# Patient Record
Sex: MALE | Race: WHITE | Employment: FULL TIME | URBAN - NONMETROPOLITAN AREA
[De-identification: names, ages, dates, MRNs, and addresses within clinical notes are randomized per-mention and may not be internally consistent; named-entity substitution may affect disease eponyms.]

---

## 2017-01-09 PROBLEM — I10 ESSENTIAL HYPERTENSION: Status: ACTIVE | Noted: 2017-01-09

## 2017-01-10 ENCOUNTER — ALLIED HEALTH/NURSE VISIT (OUTPATIENT)
Dept: FAMILY MEDICINE | Facility: CLINIC | Age: 47
End: 2017-01-10
Payer: COMMERCIAL

## 2017-01-10 VITALS — DIASTOLIC BLOOD PRESSURE: 86 MMHG | HEART RATE: 72 BPM | SYSTOLIC BLOOD PRESSURE: 130 MMHG

## 2017-01-10 DIAGNOSIS — I10 ESSENTIAL HYPERTENSION: Primary | ICD-10-CM

## 2017-01-10 DIAGNOSIS — I10 ESSENTIAL HYPERTENSION: ICD-10-CM

## 2017-01-10 LAB
ANION GAP SERPL CALCULATED.3IONS-SCNC: 8 MMOL/L (ref 3–14)
BUN SERPL-MCNC: 16 MG/DL (ref 7–30)
CALCIUM SERPL-MCNC: 9 MG/DL (ref 8.5–10.1)
CHLORIDE SERPL-SCNC: 101 MMOL/L (ref 94–109)
CO2 SERPL-SCNC: 26 MMOL/L (ref 20–32)
CREAT SERPL-MCNC: 0.99 MG/DL (ref 0.66–1.25)
GFR SERPL CREATININE-BSD FRML MDRD: 81 ML/MIN/1.7M2
GLUCOSE SERPL-MCNC: 107 MG/DL (ref 70–99)
POTASSIUM SERPL-SCNC: 3.8 MMOL/L (ref 3.4–5.3)
SODIUM SERPL-SCNC: 135 MMOL/L (ref 133–144)

## 2017-01-10 PROCEDURE — 36415 COLL VENOUS BLD VENIPUNCTURE: CPT | Performed by: NURSE PRACTITIONER

## 2017-01-10 PROCEDURE — 80048 BASIC METABOLIC PNL TOTAL CA: CPT | Performed by: NURSE PRACTITIONER

## 2017-01-10 PROCEDURE — 99207 ZZC NO CHARGE NURSE ONLY: CPT

## 2017-01-10 NOTE — NURSING NOTE
S-(situation): RN visit for BP check in F/U to 12-27-16 OV-       2. Essential hypertension  Blood pressure continues to be high    Goal is less than 140/90    Will start low dose lisinopril take in AM    See me back in 2 weeks for recheck        B-(background): Taking lisinopril as prescribed, questions possible s/e cough but at this time hard to tell as had previous viral illness- will monitor. No home BP monitoring.    A-(assessment):   BP Readings from Last 6 Encounters:   01/10/17 130/86   12/27/16 135/90   12/01/16 148/98   11/16/16 118/76   11/08/16 128/88   11/04/16 149/96     HR 72  Recheck BP same.  Does a lot of the grocery shopping and cooking, denies excess sodium intake. Denies excess caffeine intake. Active lifestyle.   Discussed how lowering sodium can help lower BP, recommended more label reading limiting sodium intake to 2000mg daily.  Per TAMAR Lim, will do BMP today- drawn.    R-(recommendations): Advised to continue lisinopril 5 mg daily, monitor/ limit sodium intake. Will call on lab results.  RN visit again in 2 wks for BP check. Forwarded to Carina for review, any further recommendations.  KAL Koenig RN

## 2017-01-13 ENCOUNTER — TELEPHONE (OUTPATIENT)
Dept: FAMILY MEDICINE | Facility: CLINIC | Age: 47
End: 2017-01-13

## 2017-01-13 NOTE — TELEPHONE ENCOUNTER
Per 01-10-17 RN BP visit-    B-(background): Taking lisinopril as prescribed, questions possible s/e cough but at this time hard to tell as had previous viral illness- will monitor. No home BP monitoring.        Spoke with pt who states now feels cough r/t side effect lisinopril. Reports cough occuring regularly after taking lisinopril.  BP Readings from Last 6 Encounters:   01/10/17 130/86   12/27/16 135/90   12/01/16 148/98   11/16/16 118/76   11/08/16 128/88   11/04/16 149/96         Component      Latest Ref Rng 1/10/2017   Sodium      133 - 144 mmol/L 135   Potassium      3.4 - 5.3 mmol/L 3.8   Chloride      94 - 109 mmol/L 101   Carbon Dioxide      20 - 32 mmol/L 26   Anion Gap      3 - 14 mmol/L 8   Glucose      70 - 99 mg/dL 107 (H)   Urea Nitrogen      7 - 30 mg/dL 16   Creatinine      0.66 - 1.25 mg/dL 0.99   GFR Estimate      >60 mL/min/1.7m2 81   GFR Estimate If Black      >60 mL/min/1.7m2 >90 . . .   Calcium      8.5 - 10.1 mg/dL 9.0     Please advise alternate Rx, F/U.  L. BERTA Koenig

## 2017-01-13 NOTE — TELEPHONE ENCOUNTER
Recommend monitor this for the next 2 weeks.   If this does not improve or seems worse. Please call and let me know.  Carina BOYLE

## 2017-01-13 NOTE — TELEPHONE ENCOUNTER
Patient was in the other day and had a BP check. He was asked if he ever had any coughing with the medication. He states he wasn't really paying attention at the time but once he thinks about it, he does have a cough when he takes the medication. Wondering if he could be switched to something else. Please advise.    Hilda [facility]

## 2017-04-24 ENCOUNTER — OFFICE VISIT (OUTPATIENT)
Dept: FAMILY MEDICINE | Facility: CLINIC | Age: 47
End: 2017-04-24
Payer: COMMERCIAL

## 2017-04-24 VITALS
SYSTOLIC BLOOD PRESSURE: 132 MMHG | WEIGHT: 262 LBS | RESPIRATION RATE: 18 BRPM | HEART RATE: 90 BPM | BODY MASS INDEX: 36.54 KG/M2 | OXYGEN SATURATION: 98 % | DIASTOLIC BLOOD PRESSURE: 86 MMHG | TEMPERATURE: 98.4 F

## 2017-04-24 DIAGNOSIS — J20.9 ACUTE BRONCHITIS, UNSPECIFIED ORGANISM: Primary | ICD-10-CM

## 2017-04-24 PROCEDURE — 99213 OFFICE O/P EST LOW 20 MIN: CPT | Performed by: NURSE PRACTITIONER

## 2017-04-24 RX ORDER — PREDNISONE 20 MG/1
40 TABLET ORAL DAILY
Qty: 10 TABLET | Refills: 0 | Status: SHIPPED | OUTPATIENT
Start: 2017-04-24 | End: 2017-04-29

## 2017-04-24 NOTE — PATIENT INSTRUCTIONS
Let try 5 days of prednisone   40 mg take in AM     Could try OVER THE COUNTER antihistamine claritin, zyrtec or allegra and see if this helps    If persists see me back

## 2017-04-24 NOTE — NURSING NOTE
"Chief Complaint   Patient presents with     Cough       Initial /86 (BP Location: Right arm, Patient Position: Chair, Cuff Size: Adult Large)  Pulse 90  Temp 98.4  F (36.9  C) (Tympanic)  Resp 18  Wt 262 lb (118.8 kg)  SpO2 98%  BMI 36.54 kg/m2 Estimated body mass index is 36.54 kg/(m^2) as calculated from the following:    Height as of 12/27/16: 5' 11\" (1.803 m).    Weight as of this encounter: 262 lb (118.8 kg).  Medication Reconciliation: complete    Health Maintenance that is potentially due pending provider review:  NONE    n/a      "

## 2017-04-24 NOTE — MR AVS SNAPSHOT
"              After Visit Summary   4/24/2017    Scott Fernandez    MRN: [id number]           Patient Information     Date Of Birth          1970        Visit Information        Provider Department      4/24/2017 1:40 PM Olga Lidia Roldan NP [facility]        Today's Diagnoses     Acute bronchitis, unspecified organism    -  1      Care Instructions    Let try 5 days of prednisone   40 mg take in AM     Could try OVER THE COUNTER antihistamine claritin, zyrtec or allegra and see if this helps    If persists see me back         Follow-ups after your visit        Who to contact     If you have questions or need follow up information about today's clinic visit or your schedule please contact [facility] directly at [phone].  Normal or non-critical lab and imaging results will be communicated to you by [employer], letter or phone within 4 business days after the clinic has received the results. If you do not hear from us within 7 days, please contact the clinic through [employer] or phone. If you have a critical or abnormal lab result, we will notify you by phone as soon as possible.  Submit refill requests through [employer] or call your pharmacy and they will forward the refill request to us. Please allow 3 business days for your refill to be completed.          Additional Information About Your Visit        MyChart Information     [employer] lets you send messages to your doctor, view your test results, renew your prescriptions, schedule appointments and more. To sign up, go to [email or web address] . Click on \"Log in\" on the left side of the screen, which will take you to the Welcome page. Then click on \"Sign up Now\" on the right side of the page.     You will be asked to enter the access code listed below, as well as some personal information. Please follow the directions to create your username and password.     Your access code is: [email or web address]  Expires: 7/23/2017  2:19 PM   "   Your access code will  in 90 days. If you need help or a new code, please call your [facility]) or [phone].        Care EveryWhere ID     This is your Care EveryWhere ID. This could be used by other organizations to access your [facility] medical records  [id number]        Your Vitals Were     Pulse Temperature Respirations Pulse Oximetry BMI (Body Mass Index)       90 98.4  F (36.9  C) (Tympanic) 18 98% 36.54 kg/m2        Blood Pressure from Last 3 Encounters:   17 132/86   01/10/17 130/86   16 135/90    Weight from Last 3 Encounters:   17 262 lb (118.8 kg)   16 265 lb (120.2 kg)   16 244 lb (110.7 kg)              Today, you had the following     No orders found for display         Today's Medication Changes          These changes are accurate as of: 17  2:19 PM.  If you have any questions, ask your nurse or doctor.               Start taking these medicines.        Dose/Directions    predniSONE 20 MG tablet   Commonly known as:  DELTASONE   Used for:  Acute bronchitis, unspecified organism   Started by:  Olga Lidia Roldan NP        Dose:  40 mg   Take 2 tablets (40 mg) by mouth daily for 5 days   Quantity:  10 tablet   Refills:  0         Stop taking these medicines if you haven't already. Please contact your care team if you have questions.     lisinopril 5 MG tablet   Commonly known as:  PRINIVIL/ZESTRIL   Stopped by:  Olga Lidia Roldan NP                Where to get your medicines      These medications were sent to [facility] - [facility] [address]   [address] [facility] [address]     Phone:  [phone]     predniSONE 20 MG tablet                Primary Care Provider Office Phone # Fax #    Olga Lidia Roldan [phone]       [facility] [address]  [facility] [address]        Thank you!     Thank you for choosing [facility]  for your care. Our goal is always to provide you  with excellent care. Hearing back from our patients is one way we can continue to improve our services. Please take a few minutes to complete the written survey that you may receive in the mail after your visit with us. Thank you!             Your Updated Medication List - Protect others around you: Learn how to safely use, store and throw away your medicines at www.disposemymeds.org.          This list is accurate as of: 4/24/17  2:19 PM.  Always use your most recent med list.                   Brand Name Dispense Instructions for use    fluticasone 50 MCG/ACT spray    FLONASE    1 Bottle    Spray 1-2 sprays into both nostrils daily       predniSONE 20 MG tablet    DELTASONE    10 tablet    Take 2 tablets (40 mg) by mouth daily for 5 days       simvastatin 20 MG tablet    ZOCOR    90 tablet    Take 1 tablet (20 mg) by mouth At Bedtime

## 2017-04-24 NOTE — PROGRESS NOTES
SUBJECTIVE:                                                    Scott Fernandez is a 46 year old male who presents to clinic today for the following health issues:      ENT Symptoms             Symptoms: cc Present Absent Comment   Fever/Chills   x    Fatigue   x    Muscle Aches   x    Eye Irritation   x    Sneezing   x    Nasal Rafael/Drg   x    Sinus Pressure/Pain   x    Loss of smell   x    Dental pain   x    Sore Throat   x    Swollen Glands   x    Ear Pain/Fullness   x    Cough  x     Wheeze   x    Chest Pain   x    Shortness of breath   x    Rash   x    Other         Symptom duration:  1 week   Symptom severity:  Cough   Treatments tried:  OTC cold and cough medications   Contacts:  none     Wt Readings from Last 10 Encounters:   04/24/17 262 lb (118.8 kg)   12/27/16 265 lb (120.2 kg)   11/16/16 244 lb (110.7 kg)   11/08/16 263 lb 6.4 oz (119.5 kg)   11/04/16 262 lb (118.8 kg)   10/28/16 262 lb (118.8 kg)   07/26/16 261 lb (118.4 kg)   03/25/16 271 lb (122.9 kg)   08/29/14 247 lb 3.2 oz (112.1 kg)   07/24/14 244 lb 9.6 oz (110.9 kg)       Stopped lisinopril   Controlled today   Made diet changes       Problem list and histories reviewed & adjusted, as indicated.  Additional history: as documented    Labs reviewed in EPIC    Reviewed and updated as needed this visit by clinical staff  Tobacco  Allergies  Med Hx  Surg Hx  Fam Hx  Soc Hx      Reviewed and updated as needed this visit by Provider         ROS:  Constitutional, HEENT, cardiovascular, pulmonary, gi and gu systems are negative, except as otherwise noted.    OBJECTIVE:                                                    /86 (BP Location: Right arm, Patient Position: Chair, Cuff Size: Adult Large)  Pulse 90  Temp 98.4  F (36.9  C) (Tympanic)  Resp 18  Wt 262 lb (118.8 kg)  SpO2 98%  BMI 36.54 kg/m2  Body mass index is 36.54 kg/(m^2).  GENERAL APPEARANCE: healthy, alert and no distress  HENT: ear canals and TM's normal and nose and  mouth without ulcers or lesions  RESP: lungs clear to auscultation - no rales, rhonchi or wheezes  CV: regular rates and rhythm, normal S1 S2, no S3 or S4 and no murmur, click or rub  ABDOMEN: soft, nontender, without hepatosplenomegaly or masses and bowel sounds normal    Diagnostic test results:  Diagnostic Test Results:  none      ASSESSMENT/PLAN:                                                    1. Acute bronchitis, unspecified organism  Will try short course of prednisone to help with symptoms management   If not better after finishing the prednisone see me back for further work up   - predniSONE (DELTASONE) 20 MG tablet; Take 2 tablets (40 mg) by mouth daily for 5 days  Dispense: 10 tablet; Refill: 0    Patient Instructions   Let try 5 days of prednisone   40 mg take in AM     Could try OVER THE COUNTER antihistamine claritin, zyrtec or allegra and see if this helps    If persists see me back       Olga Lidia Roldan NP  [facility]

## 2017-04-28 ENCOUNTER — TELEPHONE (OUTPATIENT)
Dept: FAMILY MEDICINE | Facility: CLINIC | Age: 47
End: 2017-04-28

## 2017-04-28 NOTE — TELEPHONE ENCOUNTER
Per 04-24-17 OV-    1. Acute bronchitis, unspecified organism  Will try short course of prednisone to help with symptoms management   If not better after finishing the prednisone see me back for further work up   - predniSONE (DELTASONE) 20 MG tablet; Take 2 tablets (40 mg) by mouth daily for 5 days Dispense: 10 tablet; Refill: 0     Patient Instructions   Let try 5 days of prednisone   40 mg take in AM      Could try OVER THE COUNTER antihistamine claritin, zyrtec or allegra and see if this helps     If persists see me back        Reports persistent non- productive cough despite prednisone, OTC cough/ cold/ allergy med and nasal spray.  Denies chest congestion, wheezing, SOA. No fevers. Denies sinus pressure/ pain.  States feels well except for cough, stuffy nose.  Advised need for F/U per OV instructions.  States unable to come in today (working), denies need for UC.  F/U appt with Carina scheduled 05-01-17.   UC over wknd if sx worsen.  KAL Koenig RN

## 2017-05-01 ENCOUNTER — OFFICE VISIT (OUTPATIENT)
Dept: FAMILY MEDICINE | Facility: CLINIC | Age: 47
End: 2017-05-01
Payer: COMMERCIAL

## 2017-05-01 VITALS
OXYGEN SATURATION: 99 % | RESPIRATION RATE: 18 BRPM | HEART RATE: 111 BPM | BODY MASS INDEX: 36.57 KG/M2 | DIASTOLIC BLOOD PRESSURE: 84 MMHG | SYSTOLIC BLOOD PRESSURE: 130 MMHG | WEIGHT: 262.2 LBS | TEMPERATURE: 98.6 F

## 2017-05-01 DIAGNOSIS — J01.90 ACUTE SINUSITIS WITH SYMPTOMS > 10 DAYS: Primary | ICD-10-CM

## 2017-05-01 DIAGNOSIS — E66.01 MORBID OBESITY, UNSPECIFIED OBESITY TYPE (H): ICD-10-CM

## 2017-05-01 DIAGNOSIS — J20.9 ACUTE BRONCHITIS WITH SYMPTOMS > 10 DAYS: ICD-10-CM

## 2017-05-01 PROCEDURE — 99213 OFFICE O/P EST LOW 20 MIN: CPT | Performed by: NURSE PRACTITIONER

## 2017-05-01 NOTE — PATIENT INSTRUCTIONS
1. Acute sinusitis with symptoms > 10 days    2. Acute bronchitis with symptoms > 10 days  - amoxicillin-clavulanate (AUGMENTIN) 875-125 MG per tablet; Take 1 tablet by mouth 2 times daily  Dispense: 20 tablet; Refill: 0    Sinusitis (Antibiotic Treatment)    The sinuses are air-filled spaces within the bones of the face. They connect to the inside of the nose. Sinusitis is an inflammation of the tissue lining the sinus cavity. Sinus inflammation can occur during a cold. It can also be due to allergies to pollens and other particles in the air. Sinusitis can cause symptoms of sinus congestion and fullness. A sinus infection causes fever, headache and facial pain. There is often green or yellow drainage from the nose or into the back of the throat (post-nasal drip). You have been given antibiotics to treat this condition.  Home care:    Take the full course of antibiotics as instructed. Do not stop taking them, even if you feel better.    Drink plenty of water, hot tea, and other liquids. This may help thin mucus. It also may promote sinus drainage.    Heat may help soothe painful areas of the face. Use a towel soaked in hot water. Or,  the shower and direct the hot spray onto your face. Using a vaporizer along with a menthol rub at night may also help.     An expectorant containing guaifenesin may help thin the mucus and promote drainage from the sinuses.    Over-the-counter decongestants may be used unless a similar medicine was prescribed. Nasal sprays work the fastest. Use one that contains phenylephrine or oxymetazoline. First blow the nose gently. Then use the spray. Do not use these medicines more often than directed on the label or symptoms may get worse. You may also use tablets containing pseudoephedrine. Avoid products that combine ingredients, because side effects may be increased. Read labels. You can also ask the pharmacist for help. (NOTE: Persons with high blood pressure should not use  decongestants. They can raise blood pressure.)    Over-the-counter antihistamines may help if allergies contributed to your sinusitis.      Do not use nasal rinses or irrigation during an acute sinus infection, unless told to by your health care provider. Rinsing may spread the infection to other sinuses.    Use acetaminophen or ibuprofen to control pain, unless another pain medicine was prescribed. (If you have chronic liver or kidney disease or ever had a stomach ulcer, talk with your doctor before using these medicines. Aspirin should never be used in anyone under 18 years of age who is ill with a fever. It may cause severe liver damage.)    Don't smoke. This can worsen symptoms.  Follow-up care  Follow up with your healthcare provider or our staff if you are not improving within the next week.  When to seek medical advice  Call your healthcare provider if any of these occur:    Facial pain or headache becoming more severe    Stiff neck    Unusual drowsiness or confusion    Swelling of the forehead or eyelids    Vision problems, including blurred or double vision    Fever of 100.4 F (38 C) or higher, or as directed by your healthcare provider    Seizure    Breathing problems    Symptoms not resolving within 10 days    9578-8078 [employer]. [address] [place] [address], [place] [address]. All rights reserved. This information is not intended as a substitute for professional medical care. Always follow your healthcare professional's instructions.

## 2017-05-01 NOTE — NURSING NOTE
"Chief Complaint   Patient presents with     URI     Recheck       Initial /84 (BP Location: Right arm, Patient Position: Chair, Cuff Size: Adult Large)  Pulse 111  Temp 98.6  F (37  C) (Tympanic)  Resp 18  Wt 262 lb 3.2 oz (118.9 kg)  SpO2 99%  BMI 36.57 kg/m2 Estimated body mass index is 36.57 kg/(m^2) as calculated from the following:    Height as of 12/27/16: 5' 11\" (1.803 m).    Weight as of this encounter: 262 lb 3.2 oz (118.9 kg).  Medication Reconciliation: complete    Health Maintenance that is potentially due pending provider review:  NONE    n/a      "

## 2017-05-01 NOTE — MR AVS SNAPSHOT
After Visit Summary   5/1/2017    Scott Fernandez    MRN: [id number]           Patient Information     Date Of Birth          1970        Visit Information        Provider Department      5/1/2017 9:40 AM Olga Lidia Roldan NP [facility]        Today's Diagnoses     Acute sinusitis with symptoms > 10 days    -  1    Acute bronchitis with symptoms > 10 days          Care Instructions      1. Acute sinusitis with symptoms > 10 days    2. Acute bronchitis with symptoms > 10 days  - amoxicillin-clavulanate (AUGMENTIN) 875-125 MG per tablet; Take 1 tablet by mouth 2 times daily  Dispense: 20 tablet; Refill: 0    Sinusitis (Antibiotic Treatment)    The sinuses are air-filled spaces within the bones of the face. They connect to the inside of the nose. Sinusitis is an inflammation of the tissue lining the sinus cavity. Sinus inflammation can occur during a cold. It can also be due to allergies to pollens and other particles in the air. Sinusitis can cause symptoms of sinus congestion and fullness. A sinus infection causes fever, headache and facial pain. There is often green or yellow drainage from the nose or into the back of the throat (post-nasal drip). You have been given antibiotics to treat this condition.  Home care:    Take the full course of antibiotics as instructed. Do not stop taking them, even if you feel better.    Drink plenty of water, hot tea, and other liquids. This may help thin mucus. It also may promote sinus drainage.    Heat may help soothe painful areas of the face. Use a towel soaked in hot water. Or,  the shower and direct the hot spray onto your face. Using a vaporizer along with a menthol rub at night may also help.     An expectorant containing guaifenesin may help thin the mucus and promote drainage from the sinuses.    Over-the-counter decongestants may be used unless a similar medicine was prescribed. Nasal sprays work the fastest. Use one that  contains phenylephrine or oxymetazoline. First blow the nose gently. Then use the spray. Do not use these medicines more often than directed on the label or symptoms may get worse. You may also use tablets containing pseudoephedrine. Avoid products that combine ingredients, because side effects may be increased. Read labels. You can also ask the pharmacist for help. (NOTE: Persons with high blood pressure should not use decongestants. They can raise blood pressure.)    Over-the-counter antihistamines may help if allergies contributed to your sinusitis.      Do not use nasal rinses or irrigation during an acute sinus infection, unless told to by your health care provider. Rinsing may spread the infection to other sinuses.    Use acetaminophen or ibuprofen to control pain, unless another pain medicine was prescribed. (If you have chronic liver or kidney disease or ever had a stomach ulcer, talk with your doctor before using these medicines. Aspirin should never be used in anyone under 18 years of age who is ill with a fever. It may cause severe liver damage.)    Don't smoke. This can worsen symptoms.  Follow-up care  Follow up with your healthcare provider or our staff if you are not improving within the next week.  When to seek medical advice  Call your healthcare provider if any of these occur:    Facial pain or headache becoming more severe    Stiff neck    Unusual drowsiness or confusion    Swelling of the forehead or eyelids    Vision problems, including blurred or double vision    Fever of 100.4 F (38 C) or higher, or as directed by your healthcare provider    Seizure    Breathing problems    Symptoms not resolving within 10 days    7970-5205 [employer]. [address] [place] [address], [place] [address]. All rights reserved. This information is not intended as a substitute for professional medical care. Always follow your healthcare professional's instructions.              Follow-ups after your visit    "     Who to contact     If you have questions or need follow up information about today's clinic visit or your schedule please contact [facility] directly at [phone].  Normal or non-critical lab and imaging results will be communicated to you by MyChart, letter or phone within 4 business days after the clinic has received the results. If you do not hear from us within 7 days, please contact the clinic through [employer] or phone. If you have a critical or abnormal lab result, we will notify you by phone as soon as possible.  Submit refill requests through [employer] or call your pharmacy and they will forward the refill request to us. Please allow 3 business days for your refill to be completed.          Additional Information About Your Visit        [employer] Information     [employer] lets you send messages to your doctor, view your test results, renew your prescriptions, schedule appointments and more. To sign up, go to [email or web address] . Click on \"Log in\" on the left side of the screen, which will take you to the Welcome page. Then click on \"Sign up Now\" on the right side of the page.     You will be asked to enter the access code listed below, as well as some personal information. Please follow the directions to create your username and password.     Your access code is: [email or web address]  Expires: 2017  2:19 PM     Your access code will  in 90 days. If you need help or a new code, please call your [place] [facility] clinic or [phone].        Care EveryWhere ID     This is your Care EveryWhere ID. This could be used by other organizations to access your [place] [facility] medical records  [id number]        Your Vitals Were     Pulse Temperature Respirations Pulse Oximetry BMI (Body Mass Index)       111 98.6  F (37  C) (Tympanic) 18 99% 36.57 kg/m2        Blood Pressure from Last 3 Encounters:   17 130/84   17 132/86   01/10/17 130/86    Weight from Last 3 Encounters:   17 262 lb 3.2 " oz (118.9 kg)   04/24/17 262 lb (118.8 kg)   12/27/16 265 lb (120.2 kg)              Today, you had the following     No orders found for display         Today's Medication Changes          These changes are accurate as of: 5/1/17 10:02 AM.  If you have any questions, ask your nurse or doctor.               Start taking these medicines.        Dose/Directions    amoxicillin-clavulanate 875-125 MG per tablet   Commonly known as:  AUGMENTIN   Used for:  Acute sinusitis with symptoms > 10 days, Acute bronchitis with symptoms > 10 days   Started by:  Olga Lidia Roldan NP        Dose:  1 tablet   Take 1 tablet by mouth 2 times daily   Quantity:  20 tablet   Refills:  0            Where to get your medicines      These medications were sent to [facility] [address] [place] [address] [address]  [address] [facility] [address]     Phone:  [phone]     amoxicillin-clavulanate 875-125 MG per tablet                Primary Care Provider Office Phone # Fax #    Olga Lidia Roldan [phone]       [facility] [address] [facility] [address]        Thank you!     Thank you for choosing [facility]  for your care. Our goal is always to provide you with excellent care. Hearing back from our patients is one way we can continue to improve our services. Please take a few minutes to complete the written survey that you may receive in the mail after your visit with us. Thank you!             Your Updated Medication List - Protect others around you: Learn how to safely use, store and throw away your medicines at www.disposemymeds.org.          This list is accurate as of: 5/1/17 10:02 AM.  Always use your most recent med list.                   Brand Name Dispense Instructions for use    amoxicillin-clavulanate 875-125 MG per tablet    AUGMENTIN    20 tablet    Take 1 tablet by mouth 2 times daily       fluticasone 50 MCG/ACT spray    FLONASE    1 Bottle    Spray 1-2  sprays into both nostrils daily       simvastatin 20 MG tablet    ZOCOR    90 tablet    Take 1 tablet (20 mg) by mouth At Bedtime

## 2017-05-01 NOTE — PROGRESS NOTES
SUBJECTIVE:                                                    Scott Fernandez is a 46 year old male who presents to clinic today for the following health issues:      Patient finished 5 days of prednisone  Is still coughing, now C/O of being plugged up, facial pressure  Some ear pressure.  Has been dealing with this for almost 2 weeks   Felt little relief from the prednisone     Problem list and histories reviewed & adjusted, as indicated.  Additional history: as documented    Patient Active Problem List   Diagnosis     Obesity     HYPERLIPIDEMIA LDL GOAL <130     Sleeping difficulty     CTS (carpal tunnel syndrome)     GERD (gastroesophageal reflux disease)     Proteinuria     Essential hypertension     Past Surgical History:   Procedure Laterality Date     APPENDECTOMY  1990 approx     ARTHROSCOPY SHOULDER, OPEN ROTATOR CUFF REPAIR, COMBINED  7/22/2013    Procedure: COMBINED ARTHROSCOPY SHOULDER, OPEN ROTATOR CUFF REPAIR;  Right Shoulder Arthroscopic Mini-Open Rotator Cuff Repair;  Surgeon: Ley, Jeffrey Duane, MD;  Location: [facility]     LAPAROSCOPIC CHOLECYSTECTOMY N/A 11/16/2016    Procedure: LAPAROSCOPIC CHOLECYSTECTOMY;  Surgeon: Reg Giang MD;  Location: [facility]     ORTHOPEDIC SURGERY  2009    Left Shoulder     SURGICAL HISTORY OF -   1996    shoulder left RCR     SURGICAL HISTORY OF -   1980's    Carpal tunnel procedure 2 on rt wrist and 1 on left wrist     SURGICAL HISTORY OF -   2008    shoulder left       Social History   Substance Use Topics     Smoking status: Never Smoker     Smokeless tobacco: Never Used     Alcohol use No     Family History   Problem Relation Age of Onset     Cardiovascular Mother      CHF starting around 50-idiopathic cardiomyopathy     Lipids Father      Cardiovascular Father      Stents, in late 50's     C.A.D. Father      Alcohol/Drug Maternal Grandmother      Alcohol/Drug Maternal Grandfather            Reviewed and updated as needed this visit by clinical  staff       Reviewed and updated as needed this visit by Provider         ROS:  Constitutional, HEENT, cardiovascular, pulmonary, gi and gu systems are negative, except as otherwise noted.    OBJECTIVE:                                                    /84 (BP Location: Right arm, Patient Position: Chair, Cuff Size: Adult Large)  Pulse 111  Temp 98.6  F (37  C) (Tympanic)  Resp 18  Wt 262 lb 3.2 oz (118.9 kg)  SpO2 99%  BMI 36.57 kg/m2  Body mass index is 36.57 kg/(m^2).  GENERAL APPEARANCE: healthy, alert and no distress  HENT: ear canals and TM's normal and nose and mouth without ulcers or lesions  RESP: lungs clear to auscultation - no rales, rhonchi or wheezes  CV: regular rates and rhythm, normal S1 S2, no S3 or S4 and no murmur, click or rub  ABDOMEN: soft, nontender, without hepatosplenomegaly or masses and bowel sounds normal  MS: extremities normal- no gross deformities noted  SKIN: no suspicious lesions or rashes    Diagnostic test results:  Diagnostic Test Results:  none      ASSESSMENT/PLAN:                                                    1. Acute sinusitis with symptoms > 10 days  2. Acute bronchitis with symptoms > 10 days  Given length of illness with treat with antibiotic   He is to continue the anti histamine     - amoxicillin-clavulanate (AUGMENTIN) 875-125 MG per tablet; Take 1 tablet by mouth 2 times daily  Dispense: 20 tablet; Refill: 0      3. Morbid obesity, unspecified obesity type (H)  He is working on diet (low carb) and increasing exercise     Patient Instructions     1. Acute sinusitis with symptoms > 10 days    2. Acute bronchitis with symptoms > 10 days  - amoxicillin-clavulanate (AUGMENTIN) 875-125 MG per tablet; Take 1 tablet by mouth 2 times daily  Dispense: 20 tablet; Refill: 0    Sinusitis (Antibiotic Treatment)    The sinuses are air-filled spaces within the bones of the face. They connect to the inside of the nose. Sinusitis is an inflammation of the tissue lining  the sinus cavity. Sinus inflammation can occur during a cold. It can also be due to allergies to pollens and other particles in the air. Sinusitis can cause symptoms of sinus congestion and fullness. A sinus infection causes fever, headache and facial pain. There is often green or yellow drainage from the nose or into the back of the throat (post-nasal drip). You have been given antibiotics to treat this condition.  Home care:    Take the full course of antibiotics as instructed. Do not stop taking them, even if you feel better.    Drink plenty of water, hot tea, and other liquids. This may help thin mucus. It also may promote sinus drainage.    Heat may help soothe painful areas of the face. Use a towel soaked in hot water. Or,  the shower and direct the hot spray onto your face. Using a vaporizer along with a menthol rub at night may also help.     An expectorant containing guaifenesin may help thin the mucus and promote drainage from the sinuses.    Over-the-counter decongestants may be used unless a similar medicine was prescribed. Nasal sprays work the fastest. Use one that contains phenylephrine or oxymetazoline. First blow the nose gently. Then use the spray. Do not use these medicines more often than directed on the label or symptoms may get worse. You may also use tablets containing pseudoephedrine. Avoid products that combine ingredients, because side effects may be increased. Read labels. You can also ask the pharmacist for help. (NOTE: Persons with high blood pressure should not use decongestants. They can raise blood pressure.)    Over-the-counter antihistamines may help if allergies contributed to your sinusitis.      Do not use nasal rinses or irrigation during an acute sinus infection, unless told to by your health care provider. Rinsing may spread the infection to other sinuses.    Use acetaminophen or ibuprofen to control pain, unless another pain medicine was prescribed. (If you have  chronic liver or kidney disease or ever had a stomach ulcer, talk with your doctor before using these medicines. Aspirin should never be used in anyone under 18 years of age who is ill with a fever. It may cause severe liver damage.)    Don't smoke. This can worsen symptoms.  Follow-up care  Follow up with your healthcare provider or our staff if you are not improving within the next week.  When to seek medical advice  Call your healthcare provider if any of these occur:    Facial pain or headache becoming more severe    Stiff neck    Unusual drowsiness or confusion    Swelling of the forehead or eyelids    Vision problems, including blurred or double vision    Fever of 100.4 F (38 C) or higher, or as directed by your healthcare provider    Seizure    Breathing problems    Symptoms not resolving within 10 days    8185-7623 [employer]. [address] [place] [address], [place] [address]. All rights reserved. This information is not intended as a substitute for professional medical care. Always follow your healthcare professional's instructions.            Olga Lidia Roldan NP  [facility]

## 2017-05-30 ENCOUNTER — OFFICE VISIT (OUTPATIENT)
Dept: FAMILY MEDICINE | Facility: CLINIC | Age: 47
End: 2017-05-30
Payer: COMMERCIAL

## 2017-05-30 VITALS
HEART RATE: 79 BPM | RESPIRATION RATE: 18 BRPM | OXYGEN SATURATION: 98 % | SYSTOLIC BLOOD PRESSURE: 135 MMHG | DIASTOLIC BLOOD PRESSURE: 85 MMHG | TEMPERATURE: 97.9 F

## 2017-05-30 DIAGNOSIS — J01.90 ACUTE SINUSITIS WITH SYMPTOMS > 10 DAYS: Primary | ICD-10-CM

## 2017-05-30 DIAGNOSIS — J30.89 SEASONAL ALLERGIC RHINITIS DUE TO OTHER ALLERGIC TRIGGER: ICD-10-CM

## 2017-05-30 PROCEDURE — 99213 OFFICE O/P EST LOW 20 MIN: CPT | Performed by: NURSE PRACTITIONER

## 2017-05-30 RX ORDER — LEVOFLOXACIN 500 MG/1
500 TABLET, FILM COATED ORAL DAILY
Qty: 10 TABLET | Refills: 0 | Status: SHIPPED | OUTPATIENT
Start: 2017-05-30 | End: 2018-05-23

## 2017-05-30 RX ORDER — LORATADINE 10 MG/1
10 TABLET ORAL DAILY
Qty: 30 TABLET | Refills: 1 | COMMUNITY
Start: 2017-05-30 | End: 2018-10-12

## 2017-05-30 NOTE — NURSING NOTE
"Chief Complaint   Patient presents with     URI     x 6 weeks       Initial /84 (BP Location: Right arm, Patient Position: Chair, Cuff Size: Adult Regular)  Pulse 79  Temp 97.9  F (36.6  C) (Tympanic)  Resp 18  SpO2 98% Estimated body mass index is 36.57 kg/(m^2) as calculated from the following:    Height as of 12/27/16: 5' 11\" (1.803 m).    Weight as of 5/1/17: 262 lb 3.2 oz (118.9 kg).  Medication Reconciliation: complete    Health Maintenance that is potentially due pending provider review:  PHQ9 and GAD7    Possibly completing today per provider review.    "

## 2017-05-30 NOTE — PROGRESS NOTES
SUBJECTIVE:                                                    Scott Fernandez is a 47 year old male who presents to clinic today for the following health issues:      RESPIRATORY SYMPTOMS      Duration: 6 weeks off and on    Description: sinus pressure   nasal congestion, rhinorrhea and cough    Severity: moderate    Accompanying signs and symptoms: None    History (predisposing factors):  Previously treated    Precipitating or alleviating factors: None    Therapies tried and outcome:  prednisone and antibiotic     He felt great after being on the antibiotic for 2-3 days   Once off the antibiotic symptoms returned 5/24      Problem list and histories reviewed & adjusted, as indicated.  Additional history: as documented    Patient Active Problem List   Diagnosis     Obesity     HYPERLIPIDEMIA LDL GOAL <130     Sleeping difficulty     CTS (carpal tunnel syndrome)     GERD (gastroesophageal reflux disease)     Proteinuria     Essential hypertension     Morbid obesity (H)     Past Surgical History:   Procedure Laterality Date     APPENDECTOMY  1990 approx     ARTHROSCOPY SHOULDER, OPEN ROTATOR CUFF REPAIR, COMBINED  7/22/2013    Procedure: COMBINED ARTHROSCOPY SHOULDER, OPEN ROTATOR CUFF REPAIR;  Right Shoulder Arthroscopic Mini-Open Rotator Cuff Repair;  Surgeon: Ley, Jeffrey Duane, MD;  Location: [facility]     LAPAROSCOPIC CHOLECYSTECTOMY N/A 11/16/2016    Procedure: LAPAROSCOPIC CHOLECYSTECTOMY;  Surgeon: Reg Giang MD;  Location: [facility]     ORTHOPEDIC SURGERY  2009    Left Shoulder     SURGICAL HISTORY OF -   1996    shoulder left RCR     SURGICAL HISTORY OF -   1980's    Carpal tunnel procedure 2 on rt wrist and 1 on left wrist     SURGICAL HISTORY OF -   2008    shoulder left       Social History   Substance Use Topics     Smoking status: Never Smoker     Smokeless tobacco: Never Used     Alcohol use No     Family History   Problem Relation Age of Onset     Cardiovascular Mother      CHF  starting around 50-idiopathic cardiomyopathy     Lipids Father      Cardiovascular Father      Stents, in late 50's     C.A.D. Father      Alcohol/Drug Maternal Grandmother      Alcohol/Drug Maternal Grandfather            Reviewed and updated as needed this visit by clinical staff  Tobacco  Allergies  Med Hx  Surg Hx  Fam Hx  Soc Hx      Reviewed and updated as needed this visit by Provider         ROS:  Constitutional, HEENT, cardiovascular, pulmonary, gi and gu systems are negative, except as otherwise noted.    OBJECTIVE:                                                    /85  Pulse 79  Temp 97.9  F (36.6  C) (Tympanic)  Resp 18  SpO2 98%  There is no height or weight on file to calculate BMI.  GENERAL APPEARANCE: healthy, alert and no distress  HENT: ear canals and TM's normal, nose and mouth without ulcers or lesions and TM fluid bilateral  RESP: lungs clear to auscultation - no rales, rhonchi or wheezes  CV: regular rates and rhythm, normal S1 S2, no S3 or S4 and no murmur, click or rub  ABDOMEN: soft, nontender, without hepatosplenomegaly or masses and bowel sounds normal  SKIN: no suspicious lesions or rashes  PSYCH: mentation appears normal and affect normal/bright    Diagnostic test results:  Diagnostic Test Results:  none      ASSESSMENT/PLAN:                                                      Patient Instructions   1. Acute sinusitis with symptoms > 10 days  Will treat with a second line antibiotic   Levaquin daily for 10 days   If not better after 3-4 days on this let me know   Next step CT scan of sinuses and ENT referral     - levofloxacin (LEVAQUIN) 500 MG tablet; Take 1 tablet (500 mg) by mouth daily  Dispense: 10 tablet; Refill: 0    2. Seasonal allergic rhinitis due to other allergic trigger  Stay on the claritin and flonase   - loratadine (CLARITIN) 10 MG tablet; Take 1 tablet (10 mg) by mouth daily  Dispense: 30 tablet; Refill: 1      Sinusitis (Antibiotic Treatment)    The  sinuses are air-filled spaces within the bones of the face. They connect to the inside of the nose. Sinusitis is an inflammation of the tissue lining the sinus cavity. Sinus inflammation can occur during a cold. It can also be due to allergies to pollens and other particles in the air. Sinusitis can cause symptoms of sinus congestion and fullness. A sinus infection causes fever, headache and facial pain. There is often green or yellow drainage from the nose or into the back of the throat (post-nasal drip). You have been given antibiotics to treat this condition.  Home care:    Take the full course of antibiotics as instructed. Do not stop taking them, even if you feel better.    Drink plenty of water, hot tea, and other liquids. This may help thin mucus. It also may promote sinus drainage.    Heat may help soothe painful areas of the face. Use a towel soaked in hot water. Or,  the shower and direct the hot spray onto your face. Using a vaporizer along with a menthol rub at night may also help.     An expectorant containing guaifenesin may help thin the mucus and promote drainage from the sinuses.    Over-the-counter decongestants may be used unless a similar medicine was prescribed. Nasal sprays work the fastest. Use one that contains phenylephrine or oxymetazoline. First blow the nose gently. Then use the spray. Do not use these medicines more often than directed on the label or symptoms may get worse. You may also use tablets containing pseudoephedrine. Avoid products that combine ingredients, because side effects may be increased. Read labels. You can also ask the pharmacist for help. (NOTE: Persons with high blood pressure should not use decongestants. They can raise blood pressure.)    Over-the-counter antihistamines may help if allergies contributed to your sinusitis.      Do not use nasal rinses or irrigation during an acute sinus infection, unless told to by your health care provider. Rinsing may  spread the infection to other sinuses.    Use acetaminophen or ibuprofen to control pain, unless another pain medicine was prescribed. (If you have chronic liver or kidney disease or ever had a stomach ulcer, talk with your doctor before using these medicines. Aspirin should never be used in anyone under 18 years of age who is ill with a fever. It may cause severe liver damage.)    Don't smoke. This can worsen symptoms.  Follow-up care  Follow up with your healthcare provider or our staff if you are not improving within the next week.  When to seek medical advice  Call your healthcare provider if any of these occur:    Facial pain or headache becoming more severe    Stiff neck    Unusual drowsiness or confusion    Swelling of the forehead or eyelids    Vision problems, including blurred or double vision    Fever of 100.4 F (38 C) or higher, or as directed by your healthcare provider    Seizure    Breathing problems    Symptoms not resolving within 10 days    3974-7794 [employer]. [address] [place] [address], [place] [address]. All rights reserved. This information is not intended as a substitute for professional medical care. Always follow your healthcare professional's instructions.            Olga Lidia Roldan NP  [facility]

## 2017-05-30 NOTE — PATIENT INSTRUCTIONS
1. Acute sinusitis with symptoms > 10 days  Will treat with a second line antibiotic   Levaquin daily for 10 days   If not better after 3-4 days on this let me know   Next step CT scan of sinuses and ENT referral     - levofloxacin (LEVAQUIN) 500 MG tablet; Take 1 tablet (500 mg) by mouth daily  Dispense: 10 tablet; Refill: 0    2. Seasonal allergic rhinitis due to other allergic trigger  Stay on the claritin and flonase   - loratadine (CLARITIN) 10 MG tablet; Take 1 tablet (10 mg) by mouth daily  Dispense: 30 tablet; Refill: 1      Sinusitis (Antibiotic Treatment)    The sinuses are air-filled spaces within the bones of the face. They connect to the inside of the nose. Sinusitis is an inflammation of the tissue lining the sinus cavity. Sinus inflammation can occur during a cold. It can also be due to allergies to pollens and other particles in the air. Sinusitis can cause symptoms of sinus congestion and fullness. A sinus infection causes fever, headache and facial pain. There is often green or yellow drainage from the nose or into the back of the throat (post-nasal drip). You have been given antibiotics to treat this condition.  Home care:    Take the full course of antibiotics as instructed. Do not stop taking them, even if you feel better.    Drink plenty of water, hot tea, and other liquids. This may help thin mucus. It also may promote sinus drainage.    Heat may help soothe painful areas of the face. Use a towel soaked in hot water. Or,  the shower and direct the hot spray onto your face. Using a vaporizer along with a menthol rub at night may also help.     An expectorant containing guaifenesin may help thin the mucus and promote drainage from the sinuses.    Over-the-counter decongestants may be used unless a similar medicine was prescribed. Nasal sprays work the fastest. Use one that contains phenylephrine or oxymetazoline. First blow the nose gently. Then use the spray. Do not use these medicines  more often than directed on the label or symptoms may get worse. You may also use tablets containing pseudoephedrine. Avoid products that combine ingredients, because side effects may be increased. Read labels. You can also ask the pharmacist for help. (NOTE: Persons with high blood pressure should not use decongestants. They can raise blood pressure.)    Over-the-counter antihistamines may help if allergies contributed to your sinusitis.      Do not use nasal rinses or irrigation during an acute sinus infection, unless told to by your health care provider. Rinsing may spread the infection to other sinuses.    Use acetaminophen or ibuprofen to control pain, unless another pain medicine was prescribed. (If you have chronic liver or kidney disease or ever had a stomach ulcer, talk with your doctor before using these medicines. Aspirin should never be used in anyone under 18 years of age who is ill with a fever. It may cause severe liver damage.)    Don't smoke. This can worsen symptoms.  Follow-up care  Follow up with your healthcare provider or our staff if you are not improving within the next week.  When to seek medical advice  Call your healthcare provider if any of these occur:    Facial pain or headache becoming more severe    Stiff neck    Unusual drowsiness or confusion    Swelling of the forehead or eyelids    Vision problems, including blurred or double vision    Fever of 100.4 F (38 C) or higher, or as directed by your healthcare provider    Seizure    Breathing problems    Symptoms not resolving within 10 days    0445-7393 [employer]. [address] [place] [address]. All rights reserved. This information is not intended as a substitute for professional medical care. Always follow your healthcare professional's instructions.

## 2017-05-30 NOTE — MR AVS SNAPSHOT
After Visit Summary   5/30/2017    Scott Fernandez    MRN: [id number]           Patient Information     Date Of Birth          1970        Visit Information        Provider Department      5/30/2017 3:20 PM Olga Lidia Roldan NP [facility]        Today's Diagnoses     Acute sinusitis with symptoms > 10 days    -  1    Seasonal allergic rhinitis due to other allergic trigger          Care Instructions    1. Acute sinusitis with symptoms > 10 days  Will treat with a second line antibiotic   Levaquin daily for 10 days   If not better after 3-4 days on this let me know   Next step CT scan of sinuses and ENT referral     - levofloxacin (LEVAQUIN) 500 MG tablet; Take 1 tablet (500 mg) by mouth daily  Dispense: 10 tablet; Refill: 0    2. Seasonal allergic rhinitis due to other allergic trigger  Stay on the claritin and flonase   - loratadine (CLARITIN) 10 MG tablet; Take 1 tablet (10 mg) by mouth daily  Dispense: 30 tablet; Refill: 1      Sinusitis (Antibiotic Treatment)    The sinuses are air-filled spaces within the bones of the face. They connect to the inside of the nose. Sinusitis is an inflammation of the tissue lining the sinus cavity. Sinus inflammation can occur during a cold. It can also be due to allergies to pollens and other particles in the air. Sinusitis can cause symptoms of sinus congestion and fullness. A sinus infection causes fever, headache and facial pain. There is often green or yellow drainage from the nose or into the back of the throat (post-nasal drip). You have been given antibiotics to treat this condition.  Home care:    Take the full course of antibiotics as instructed. Do not stop taking them, even if you feel better.    Drink plenty of water, hot tea, and other liquids. This may help thin mucus. It also may promote sinus drainage.    Heat may help soothe painful areas of the face. Use a towel soaked in hot water. Or,  the shower and direct  the hot spray onto your face. Using a vaporizer along with a menthol rub at night may also help.     An expectorant containing guaifenesin may help thin the mucus and promote drainage from the sinuses.    Over-the-counter decongestants may be used unless a similar medicine was prescribed. Nasal sprays work the fastest. Use one that contains phenylephrine or oxymetazoline. First blow the nose gently. Then use the spray. Do not use these medicines more often than directed on the label or symptoms may get worse. You may also use tablets containing pseudoephedrine. Avoid products that combine ingredients, because side effects may be increased. Read labels. You can also ask the pharmacist for help. (NOTE: Persons with high blood pressure should not use decongestants. They can raise blood pressure.)    Over-the-counter antihistamines may help if allergies contributed to your sinusitis.      Do not use nasal rinses or irrigation during an acute sinus infection, unless told to by your health care provider. Rinsing may spread the infection to other sinuses.    Use acetaminophen or ibuprofen to control pain, unless another pain medicine was prescribed. (If you have chronic liver or kidney disease or ever had a stomach ulcer, talk with your doctor before using these medicines. Aspirin should never be used in anyone under 18 years of age who is ill with a fever. It may cause severe liver damage.)    Don't smoke. This can worsen symptoms.  Follow-up care  Follow up with your healthcare provider or our staff if you are not improving within the next week.  When to seek medical advice  Call your healthcare provider if any of these occur:    Facial pain or headache becoming more severe    Stiff neck    Unusual drowsiness or confusion    Swelling of the forehead or eyelids    Vision problems, including blurred or double vision    Fever of 100.4 F (38 C) or higher, or as directed by your healthcare provider    Seizure    Breathing  "problems    Symptoms not resolving within 10 days    2974-5521 [employer]. [address] [place] [address], [place] [address]. All rights reserved. This information is not intended as a substitute for professional medical care. Always follow your healthcare professional's instructions.                Follow-ups after your visit        Who to contact     If you have questions or need follow up information about today's clinic visit or your schedule please contact [facility] directly at [phone].  Normal or non-critical lab and imaging results will be communicated to you by MyChart, letter or phone within 4 business days after the clinic has received the results. If you do not hear from us within 7 days, please contact the clinic through [facility] or phone. If you have a critical or abnormal lab result, we will notify you by phone as soon as possible.  Submit refill requests through [employer] or call your pharmacy and they will forward the refill request to us. Please allow 3 business days for your refill to be completed.          Additional Information About Your Visit        [facility] Information     [employer] lets you send messages to your doctor, view your test results, renew your prescriptions, schedule appointments and more. To sign up, go to [email or web address] . Click on \"Log in\" on the left side of the screen, which will take you to the Welcome page. Then click on \"Sign up Now\" on the right side of the page.     You will be asked to enter the access code listed below, as well as some personal information. Please follow the directions to create your username and password.     Your access code is: [email or web address]  Expires: 2017  2:19 PM     Your access code will  in 90 days. If you need help or a new code, please call your [facility] or [phone].        Care EveryWhere ID     This is your Care EveryWhere ID. This could be used by other organizations to access your [facility] " medical records  [id number]        Your Vitals Were     Pulse Temperature Respirations Pulse Oximetry          79 97.9  F (36.6  C) (Tympanic) 18 98%         Blood Pressure from Last 3 Encounters:   05/30/17 135/85   05/01/17 130/84   04/24/17 132/86    Weight from Last 3 Encounters:   05/01/17 262 lb 3.2 oz (118.9 kg)   04/24/17 262 lb (118.8 kg)   12/27/16 265 lb (120.2 kg)              Today, you had the following     No orders found for display         Today's Medication Changes          These changes are accurate as of: 5/30/17  3:49 PM.  If you have any questions, ask your nurse or doctor.               Start taking these medicines.        Dose/Directions    levofloxacin 500 MG tablet   Commonly known as:  LEVAQUIN   Used for:  Acute sinusitis with symptoms > 10 days   Started by:  Olga Lidia Roldan NP        Dose:  500 mg   Take 1 tablet (500 mg) by mouth daily   Quantity:  10 tablet   Refills:  0            Where to get your medicines      These medications were sent to [facility] [address] [place] [address] [address] [place] [address]  [address] [facility] [address]     Phone:  [phone]     levofloxacin 500 MG tablet                Primary Care Provider Office Phone # Fax #    Olga Lidia Roldan [phone]       [facility] [address] [facility] [address]        Thank you!     Thank you for choosing [facility]  for your care. Our goal is always to provide you with excellent care. Hearing back from our patients is one way we can continue to improve our services. Please take a few minutes to complete the written survey that you may receive in the mail after your visit with us. Thank you!             Your Updated Medication List - Protect others around you: Learn how to safely use, store and throw away your medicines at www.disposemymeds.org.          This list is accurate as of: 5/30/17  3:49 PM.  Always use your most recent med list.                    Brand Name Dispense Instructions for use    CLARITIN 10 MG tablet   Generic drug:  loratadine     30 tablet    Take 1 tablet (10 mg) by mouth daily       fluticasone 50 MCG/ACT spray    FLONASE    1 Bottle    Spray 1-2 sprays into both nostrils daily       levofloxacin 500 MG tablet    LEVAQUIN    10 tablet    Take 1 tablet (500 mg) by mouth daily       simvastatin 20 MG tablet    ZOCOR    90 tablet    Take 1 tablet (20 mg) by mouth At Bedtime

## 2018-05-23 ENCOUNTER — OFFICE VISIT (OUTPATIENT)
Dept: FAMILY MEDICINE | Facility: CLINIC | Age: 48
End: 2018-05-23
Payer: COMMERCIAL

## 2018-05-23 VITALS
WEIGHT: 245 LBS | SYSTOLIC BLOOD PRESSURE: 136 MMHG | BODY MASS INDEX: 34.17 KG/M2 | DIASTOLIC BLOOD PRESSURE: 88 MMHG | RESPIRATION RATE: 16 BRPM | OXYGEN SATURATION: 98 % | HEART RATE: 88 BPM | TEMPERATURE: 98.2 F

## 2018-05-23 DIAGNOSIS — J30.2 SEASONAL ALLERGIC RHINITIS, UNSPECIFIED CHRONICITY, UNSPECIFIED TRIGGER: Primary | ICD-10-CM

## 2018-05-23 PROCEDURE — 99213 OFFICE O/P EST LOW 20 MIN: CPT | Performed by: FAMILY MEDICINE

## 2018-05-23 RX ORDER — CETIRIZINE HYDROCHLORIDE 10 MG/1
10 TABLET ORAL
Qty: 30 TABLET | Refills: 1 | Status: SHIPPED | OUTPATIENT
Start: 2018-05-23 | End: 2019-09-26

## 2018-05-23 RX ORDER — FLUTICASONE PROPIONATE 50 MCG
1-2 SPRAY, SUSPENSION (ML) NASAL DAILY
Qty: 1 BOTTLE | Refills: 3 | Status: SHIPPED | OUTPATIENT
Start: 2018-05-23 | End: 2019-09-26

## 2018-05-23 ASSESSMENT — PAIN SCALES - GENERAL: PAINLEVEL: NO PAIN (0)

## 2018-05-23 NOTE — MR AVS SNAPSHOT
After Visit Summary   5/23/2018    Scott Fernandez    MRN: [id number]           Patient Information     Date Of Birth          1970        Visit Information        Provider Department      5/23/2018 2:00 PM Galileo Smith MD [facility]        Today's Diagnoses     Seasonal allergic rhinitis, unspecified chronicity, unspecified trigger    -  1      Care Instructions      Seasonal Allergy  Seasonal allergy is also called hay fever. It may occur after a person is exposed to pollens released from grasses, weeds, trees and shrubs. This type of allergy occurs during the spring and summer when the pollen contacts the lining of the nose, eyes, eyelids, sinuses and throat. This causes histamine to be released from the tissues. Histamine causes itching and swelling. This may produce a watery discharge from the eyes or nose. Violent sneezing, nasal congestion, post-nasal drip, itching of the eyes, nose, throat and mouth, scratchy throat, and dry cough may also occur.  Home care  Seasonal allergy cannot be cured, but symptoms can be reduced by these measures:    Stay away from or limit your time near the allergen as much as you can:    ? Stay indoors on windy days of pollen season.   ? Keep windows and doors closed. Use air conditioning instead in your home and car. This filters the air.  ? Change air conditioner filters often.  ? Take a shower, wash your hair, and change clothes after being outdoors.  ? Put on a NIOSH-rated 95 filter mask when working outdoors. Before going outside, take your allergy medicine as advised by your healthcare provider.    Decongestant pills and sprays reduce tissue swelling and watery discharge. Overuse of nasal decongestant sprays may make symptoms worse. Do not use these more often than recommended. Sometimes you can experience a rebound effect (symptoms worsen), when stopping them. Talk to your healthcare provider or pharmacist about these medicines  before taking them, especially if you have high blood pressure or heart problems.     Antihistamines block the release of histamine during the allergic response. They work better when taken before symptoms develop. Unless a prescription antihistamine was prescribed, you can take over-the-counter antihistamines that do not cause drowsiness.  Ask your pharmacist for suggestions.    Steroid nasal sprays or oral steroids may also be prescribed for more severe symptoms. These help to reduce the local inflammation that can add to the allergic response.    If you have asthma, pollen season may make your asthma symptoms worse. It is important that you use your asthma medicines as directed during this time to prevent or treat attacks. Some persons with asthma have asthma symptoms that get worse when they take antihistamines. This is due to the drying effect on the lungs. If you notice this, stop the antihistamines, drink extra fluids and notify your doctor.    If you have sinus congestion or drainage, a saline nasal rinse may give relief. A saline nasal rinse lessens the swelling and clears excess mucus. This allows sinuses to drain. Prepackaged kits are sold at most drug stores. These contain pre-mixed salt packets and an irrigation device.  Follow-up care  Follow up with your healthcare provider, or as advised. If you have been referred to a specialist, make an appointment promptly.  When to seek medical advice  Call your healthcare provider right away for any of the following:    Facial, ear or sinus pain; colored drainage from the nose    Headaches    You have asthma and your asthma symptoms do not respond to the usual doses of your medicine    Cough with colored sputum (mucus)    Fever of 100.4 F (38 C) or higher, or as directed by the healthcare provider  Call 911  Call 911 if any of these occur:    Trouble breathing or swallowing, wheezing    Hoarse voice, trouble speaking, or drooling    Confusion    Very drowsy or  trouble awakening    Fainting or loss of consciousness    Rapid heart rate, or weak pulse    Low blood pressure    Feeling of doom    Nausea, vomiting, abdominal pain, diarrhea    Vomiting blood, or large amounts of blood in stool    Seizure    Cold, moist, or pale (blue in color) skin  Date Last Reviewed: 5/1/2017 2000-2017 [employer]. [address] [place] [address]. All rights reserved. This information is not intended as a substitute for professional medical care. Always follow your healthcare professional's instructions.                Follow-ups after your visit        Additional Services     ALLERGY/ASTHMA ADULT REFERRAL       Your provider has referred you to: [facility]: [facility] [phone] [email or web address]    Please be aware that coverage of these services is subject to the terms and limitations of your health insurance plan.  Call member services at your health plan with any benefit or coverage questions.      Please bring the following with you to your appointment:    (1) Any X-Rays, CTs or MRIs which have been performed.  Contact the facility where they were done to arrange for  prior to your scheduled appointment.    (2) List of current medications  (3) This referral request   (4) Any documents/labs given to you for this referral                  Who to contact     If you have questions or need follow up information about today's clinic visit or your schedule please contact [facility] directly at [phone].  Normal or non-critical lab and imaging results will be communicated to you by MyChart, letter or phone within 4 business days after the clinic has received the results. If you do not hear from us within 7 days, please contact the clinic through MyChart or phone. If you have a critical or abnormal lab result, we will notify you by phone as soon as possible.  Submit refill requests through [facility] or  call your pharmacy and they will forward the refill request to us. Please allow 3 business days for your refill to be completed.          Additional Information About Your Visit        Care EveryWhere ID     This is your Care EveryWhere ID. This could be used by other organizations to access your [facility] medical records  [id number]        Your Vitals Were     Pulse Temperature Respirations Pulse Oximetry BMI (Body Mass Index)       88 98.2  F (36.8  C) (Tympanic) 16 98% 34.17 kg/m2        Blood Pressure from Last 3 Encounters:   05/23/18 136/88   05/30/17 135/85   05/01/17 130/84    Weight from Last 3 Encounters:   05/23/18 245 lb (111.1 kg)   05/01/17 262 lb 3.2 oz (118.9 kg)   04/24/17 262 lb (118.8 kg)              We Performed the Following     ALLERGY/ASTHMA ADULT REFERRAL          Today's Medication Changes          These changes are accurate as of 5/23/18  2:13 PM.  If you have any questions, ask your nurse or doctor.               Start taking these medicines.        Dose/Directions    cetirizine 10 MG tablet   Commonly known as:  zyrTEC   Used for:  Seasonal allergic rhinitis, unspecified chronicity, unspecified trigger   Started by:  Galileo Smith MD        Dose:  10 mg   Take 1 tablet (10 mg) by mouth every evening as needed for allergies   Quantity:  30 tablet   Refills:  1         These medicines have changed or have updated prescriptions.        Dose/Directions    * fluticasone 50 MCG/ACT spray   Commonly known as:  FLONASE   This may have changed:  Another medication with the same name was added. Make sure you understand how and when to take each.   Used for:  Viral URI   Changed by:  Galileo Smith MD        Dose:  1-2 spray   Spray 1-2 sprays into both nostrils daily   Quantity:  1 Bottle   Refills:  0       * fluticasone 50 MCG/ACT spray   Commonly known as:  FLONASE   This may have changed:  You were already taking a medication with the same name, and this prescription was added. Make  sure you understand how and when to take each.   Used for:  Seasonal allergic rhinitis, unspecified chronicity, unspecified trigger   Changed by:  Galileo Smith MD        Dose:  1-2 spray   Spray 1-2 sprays into both nostrils daily   Quantity:  1 Bottle   Refills:  3       * Notice:  This list has 2 medication(s) that are the same as other medications prescribed for you. Read the directions carefully, and ask your doctor or other care provider to review them with you.         Where to get your medicines      These medications were sent to [id number] [address] [facility]  [address] [facility] [address]     Phone:  [phone]     cetirizine 10 MG tablet    fluticasone 50 MCG/ACT spray                Primary Care Provider Office Phone # Fax #    Olga Lidia Roladn, [phone]       [address]  [facility] [address]        Equal Access to Services     GITA VELAZCO : Hadii aad ku hadasho Soomaali, waaxda luqadaha, qaybta kaalmada adeegyada, waxay idiin hayaan jorge alberto kharash aleksander . So [facility] [phone].    ATENCIÓN: Si habla español, tiene a jaimes disposición servicios gratuitos de asistencia lingüística. [facility] [phone].    We comply with applicable federal civil rights laws and [place] laws. We do not discriminate on the basis of race, color, national origin, age, disability, sex, sexual orientation, or gender identity.            Thank you!     Thank you for choosing [facility]  for your care. Our goal is always to provide you with excellent care. Hearing back from our patients is one way we can continue to improve our services. Please take a few minutes to complete the written survey that you may receive in the mail after your visit with us. Thank you!             Your Updated Medication List - Protect others around you: Learn how to safely use, store and throw away your medicines at www.disposemymeds.org.          This list is accurate as  of 5/23/18  2:13 PM.  Always use your most recent med list.                   Brand Name Dispense Instructions for use Diagnosis    ALLERGY MED PO           cetirizine 10 MG tablet    zyrTEC    30 tablet    Take 1 tablet (10 mg) by mouth every evening as needed for allergies    Seasonal allergic rhinitis, unspecified chronicity, unspecified trigger       CLARITIN 10 MG tablet   Generic drug:  loratadine     30 tablet    Take 1 tablet (10 mg) by mouth daily    Seasonal allergic rhinitis due to other allergic trigger       * fluticasone 50 MCG/ACT spray    FLONASE    1 Bottle    Spray 1-2 sprays into both nostrils daily    Viral URI       * fluticasone 50 MCG/ACT spray    FLONASE    1 Bottle    Spray 1-2 sprays into both nostrils daily    Seasonal allergic rhinitis, unspecified chronicity, unspecified trigger       simvastatin 20 MG tablet    ZOCOR    90 tablet    Take 1 tablet (20 mg) by mouth At Bedtime    Hyperlipidemia LDL goal <130       * Notice:  This list has 2 medication(s) that are the same as other medications prescribed for you. Read the directions carefully, and ask your doctor or other care provider to review them with you.

## 2018-05-23 NOTE — PROGRESS NOTES
SUBJECTIVE:   Scott Fernandez is a 48 year old male who presents to clinic today for the following health issues:      ENT Symptoms             Symptoms: cc Present Absent Comment   Fever/Chills  x  chills   Fatigue  x  Hard time sleeping   Muscle Aches   x    Eye Irritation   x    Sneezing  x  rarely   Nasal Rafael/Drg  x     Sinus Pressure/Pain   x    Loss of smell   x    Dental pain   x    Sore Throat   x    Swollen Glands   x    Ear Pain/Fullness  x  Little bit - better   Cough  x  rarely   Wheeze   x    Chest Pain   x    Shortness of breath   x    Rash   x    Other         Symptom duration:  2 weeks   Symptom severity:  moderate   Treatments tried:  alkaseltzer cold medicine, mucinex   Contacts:  none         Problem list and histories reviewed & adjusted, as indicated.  Additional history: as documented    Patient Active Problem List   Diagnosis     Obesity     HYPERLIPIDEMIA LDL GOAL <130     Sleeping difficulty     CTS (carpal tunnel syndrome)     GERD (gastroesophageal reflux disease)     Proteinuria     Essential hypertension     Morbid obesity (H)     Past Surgical History:   Procedure Laterality Date     APPENDECTOMY  1990 approx     ARTHROSCOPY SHOULDER, OPEN ROTATOR CUFF REPAIR, COMBINED  7/22/2013    Procedure: COMBINED ARTHROSCOPY SHOULDER, OPEN ROTATOR CUFF REPAIR;  Right Shoulder Arthroscopic Mini-Open Rotator Cuff Repair;  Surgeon: Ley, Jeffrey Duane, MD;  Location: [facility]     LAPAROSCOPIC CHOLECYSTECTOMY N/A 11/16/2016    Procedure: LAPAROSCOPIC CHOLECYSTECTOMY;  Surgeon: Reg Giang MD;  Location: [facility]     ORTHOPEDIC SURGERY  2009    Left Shoulder     SURGICAL HISTORY OF -   1996    shoulder left RCR     SURGICAL HISTORY OF -   1980's    Carpal tunnel procedure 2 on rt wrist and 1 on left wrist     SURGICAL HISTORY OF -   2008    shoulder left       Social History   Substance Use Topics     Smoking status: Never Smoker     Smokeless tobacco: Never Used     Alcohol use No      Family History   Problem Relation Age of Onset     Cardiovascular Mother      CHF starting around 50-idiopathic cardiomyopathy     Lipids Father      Cardiovascular Father      Stents, in late 50's     C.A.D. Father      Alcohol/Drug Maternal Grandmother      Alcohol/Drug Maternal Grandfather          Current Outpatient Prescriptions   Medication Sig Dispense Refill     DiphenhydrAMINE HCl (ALLERGY MED PO)        fluticasone (FLONASE) 50 MCG/ACT spray Spray 1-2 sprays into both nostrils daily (Patient not taking: Reported on 5/23/2018) 1 Bottle 0     loratadine (CLARITIN) 10 MG tablet Take 1 tablet (10 mg) by mouth daily 30 tablet 1     simvastatin (ZOCOR) 20 MG tablet Take 1 tablet (20 mg) by mouth At Bedtime 90 tablet 3     Allergies   Allergen Reactions     Nkda [No Known Drug Allergies]      Recent Labs   Lab Test  01/10/17   0930  07/26/16   0942  03/25/16   0910  01/27/14   0830  01/21/14   0859  11/16/12   1218  11/16/12   1159  09/18/12   0921   A1C   --    --    --   5.0   --    --    --    --    LDL   --    --   128*   --   111   --   127   --    HDL   --    --   50   --   47   --   45   --    TRIG   --    --   235*   --   169*   --   263*   --    ALT   --   37   --    --    --   54   --   53   CR  0.99  1.08   --    --   1.03   --    --   1.06   GFRESTIMATED  81  73   --    --   78   --    --   76   GFRESTBLACK  >90   GFR Calc    89   --    --   >90   --    --   >90   POTASSIUM  3.8  4.2   --    --   4.0   --    --   3.8   TSH   --    --   2.27   --    --    --    --    --       BP Readings from Last 3 Encounters:   05/23/18 136/88   05/30/17 135/85   05/01/17 130/84    Wt Readings from Last 3 Encounters:   05/23/18 245 lb (111.1 kg)   05/01/17 262 lb 3.2 oz (118.9 kg)   04/24/17 262 lb (118.8 kg)                  Labs reviewed in EPIC    Reviewed and updated as needed this visit by clinical staff       Reviewed and updated as needed this visit by Provider         ROS:  Constitutional,  HEENT, cardiovascular, pulmonary, gi and gu systems are negative, except as otherwise noted.    OBJECTIVE:     /88  Pulse 88  Temp 98.2  F (36.8  C) (Tympanic)  Resp 16  Wt 245 lb (111.1 kg)  SpO2 98%  BMI 34.17 kg/m2  Body mass index is 34.17 kg/(m^2).  GENERAL: healthy, alert and no distress  EYES: Eyes grossly normal to inspection, PERRL and conjunctivae and sclerae normal  HENT: normal cephalic/atraumatic, ear canals and TM's normal, nasal mucosa edematous , oropharynx clear, oral mucous membranes moist and sinuses: not tender  NECK: no adenopathy, no asymmetry, masses, or scars and thyroid normal to palpation  RESP: lungs clear to auscultation - no rales, rhonchi or wheezes  CV: regular rates and rhythm, normal S1 S2, no S3 or S4 and no murmur, click or rub  ABDOMEN: soft, nontender, no hepatosplenomegaly, no masses and bowel sounds normal  MS: no gross musculoskeletal defects noted, no edema    ASSESSMENT/PLAN:         ICD-10-CM    1. Seasonal allergic rhinitis, unspecified chronicity, unspecified trigger J30.2 fluticasone (FLONASE) 50 MCG/ACT spray     cetirizine (ZYRTEC) 10 MG tablet     ALLERGY/ASTHMA ADULT REFERRAL     Suspect symptoms secondary to seasonal allergies.  Suggested to use Flonase and cetirizine.  Continue well hydration, warm fluids and humidifier use.  Allergist referral placed for further review and recommendations.  Written information provided.  Patient understood and in agreement with above plan.  All questions answered.        Patient Instructions     Seasonal Allergy  Seasonal allergy is also called hay fever. It may occur after a person is exposed to pollens released from grasses, weeds, trees and shrubs. This type of allergy occurs during the spring and summer when the pollen contacts the lining of the nose, eyes, eyelids, sinuses and throat. This causes histamine to be released from the tissues. Histamine causes itching and swelling. This may produce a watery discharge  from the eyes or nose. Violent sneezing, nasal congestion, post-nasal drip, itching of the eyes, nose, throat and mouth, scratchy throat, and dry cough may also occur.  Home care  Seasonal allergy cannot be cured, but symptoms can be reduced by these measures:    Stay away from or limit your time near the allergen as much as you can:    ? Stay indoors on windy days of pollen season.   ? Keep windows and doors closed. Use air conditioning instead in your home and car. This filters the air.  ? Change air conditioner filters often.  ? Take a shower, wash your hair, and change clothes after being outdoors.  ? Put on a NIOSH-rated 95 filter mask when working outdoors. Before going outside, take your allergy medicine as advised by your healthcare provider.    Decongestant pills and sprays reduce tissue swelling and watery discharge. Overuse of nasal decongestant sprays may make symptoms worse. Do not use these more often than recommended. Sometimes you can experience a rebound effect (symptoms worsen), when stopping them. Talk to your healthcare provider or pharmacist about these medicines before taking them, especially if you have high blood pressure or heart problems.     Antihistamines block the release of histamine during the allergic response. They work better when taken before symptoms develop. Unless a prescription antihistamine was prescribed, you can take over-the-counter antihistamines that do not cause drowsiness.  Ask your pharmacist for suggestions.    Steroid nasal sprays or oral steroids may also be prescribed for more severe symptoms. These help to reduce the local inflammation that can add to the allergic response.    If you have asthma, pollen season may make your asthma symptoms worse. It is important that you use your asthma medicines as directed during this time to prevent or treat attacks. Some persons with asthma have asthma symptoms that get worse when they take antihistamines. This is due to the  drying effect on the lungs. If you notice this, stop the antihistamines, drink extra fluids and notify your doctor.    If you have sinus congestion or drainage, a saline nasal rinse may give relief. A saline nasal rinse lessens the swelling and clears excess mucus. This allows sinuses to drain. Prepackaged kits are sold at most drug stores. These contain pre-mixed salt packets and an irrigation device.  Follow-up care  Follow up with your healthcare provider, or as advised. If you have been referred to a specialist, make an appointment promptly.  When to seek medical advice  Call your healthcare provider right away for any of the following:    Facial, ear or sinus pain; colored drainage from the nose    Headaches    You have asthma and your asthma symptoms do not respond to the usual doses of your medicine    Cough with colored sputum (mucus)    Fever of 100.4 F (38 C) or higher, or as directed by the healthcare provider  Call 911  Call 911 if any of these occur:    Trouble breathing or swallowing, wheezing    Hoarse voice, trouble speaking, or drooling    Confusion    Very drowsy or trouble awakening    Fainting or loss of consciousness    Rapid heart rate, or weak pulse    Low blood pressure    Feeling of doom    Nausea, vomiting, abdominal pain, diarrhea    Vomiting blood, or large amounts of blood in stool    Seizure    Cold, moist, or pale (blue in color) skin  Date Last Reviewed: 5/1/2017 2000-2017 [employer]. [address] [place] [id number]. All rights reserved. This information is not intended as a substitute for professional medical care. Always follow your healthcare professional's instructions.            Galileo Smith MD  [facility]

## 2018-05-23 NOTE — PATIENT INSTRUCTIONS
Seasonal Allergy  Seasonal allergy is also called hay fever. It may occur after a person is exposed to pollens released from grasses, weeds, trees and shrubs. This type of allergy occurs during the spring and summer when the pollen contacts the lining of the nose, eyes, eyelids, sinuses and throat. This causes histamine to be released from the tissues. Histamine causes itching and swelling. This may produce a watery discharge from the eyes or nose. Violent sneezing, nasal congestion, post-nasal drip, itching of the eyes, nose, throat and mouth, scratchy throat, and dry cough may also occur.  Home care  Seasonal allergy cannot be cured, but symptoms can be reduced by these measures:    Stay away from or limit your time near the allergen as much as you can:    ? Stay indoors on windy days of pollen season.   ? Keep windows and doors closed. Use air conditioning instead in your home and car. This filters the air.  ? Change air conditioner filters often.  ? Take a shower, wash your hair, and change clothes after being outdoors.  ? Put on a NIOSH-rated 95 filter mask when working outdoors. Before going outside, take your allergy medicine as advised by your healthcare provider.    Decongestant pills and sprays reduce tissue swelling and watery discharge. Overuse of nasal decongestant sprays may make symptoms worse. Do not use these more often than recommended. Sometimes you can experience a rebound effect (symptoms worsen), when stopping them. Talk to your healthcare provider or pharmacist about these medicines before taking them, especially if you have high blood pressure or heart problems.     Antihistamines block the release of histamine during the allergic response. They work better when taken before symptoms develop. Unless a prescription antihistamine was prescribed, you can take over-the-counter antihistamines that do not cause drowsiness.  Ask your pharmacist for suggestions.    Steroid nasal sprays or oral  steroids may also be prescribed for more severe symptoms. These help to reduce the local inflammation that can add to the allergic response.    If you have asthma, pollen season may make your asthma symptoms worse. It is important that you use your asthma medicines as directed during this time to prevent or treat attacks. Some persons with asthma have asthma symptoms that get worse when they take antihistamines. This is due to the drying effect on the lungs. If you notice this, stop the antihistamines, drink extra fluids and notify your doctor.    If you have sinus congestion or drainage, a saline nasal rinse may give relief. A saline nasal rinse lessens the swelling and clears excess mucus. This allows sinuses to drain. Prepackaged kits are sold at most drug stores. These contain pre-mixed salt packets and an irrigation device.  Follow-up care  Follow up with your healthcare provider, or as advised. If you have been referred to a specialist, make an appointment promptly.  When to seek medical advice  Call your healthcare provider right away for any of the following:    Facial, ear or sinus pain; colored drainage from the nose    Headaches    You have asthma and your asthma symptoms do not respond to the usual doses of your medicine    Cough with colored sputum (mucus)    Fever of 100.4 F (38 C) or higher, or as directed by the healthcare provider  Call 911  Call 911 if any of these occur:    Trouble breathing or swallowing, wheezing    Hoarse voice, trouble speaking, or drooling    Confusion    Very drowsy or trouble awakening    Fainting or loss of consciousness    Rapid heart rate, or weak pulse    Low blood pressure    Feeling of doom    Nausea, vomiting, abdominal pain, diarrhea    Vomiting blood, or large amounts of blood in stool    Seizure    Cold, moist, or pale (blue in color) skin  Date Last Reviewed: 5/1/2017 2000-2017 [employer]. [address] [place] [address], [place] [address]. All rights  reserved. This information is not intended as a substitute for professional medical care. Always follow your healthcare professional's instructions.

## 2018-05-23 NOTE — NURSING NOTE
"Chief Complaint   Patient presents with     Sinus Problem       Initial /88  Pulse 88  Temp 98.2  F (36.8  C) (Tympanic)  Resp 16  Wt 245 lb (111.1 kg)  SpO2 98%  BMI 34.17 kg/m2 Estimated body mass index is 34.17 kg/(m^2) as calculated from the following:    Height as of 12/27/16: 5' 11\" (1.803 m).    Weight as of this encounter: 245 lb (111.1 kg).      Health Maintenance that is potentially due pending provider review:  HIV screening    Possibly completing today per provider review.    Is there anyone who you would like to be able to receive your results? No  If yes have patient fill out DYLAN      "

## 2018-10-12 ENCOUNTER — OFFICE VISIT (OUTPATIENT)
Dept: FAMILY MEDICINE | Facility: CLINIC | Age: 48
End: 2018-10-12
Payer: COMMERCIAL

## 2018-10-12 VITALS
DIASTOLIC BLOOD PRESSURE: 88 MMHG | SYSTOLIC BLOOD PRESSURE: 138 MMHG | OXYGEN SATURATION: 99 % | BODY MASS INDEX: 35.43 KG/M2 | HEART RATE: 83 BPM | RESPIRATION RATE: 16 BRPM | WEIGHT: 254 LBS | TEMPERATURE: 98.3 F

## 2018-10-12 DIAGNOSIS — M77.11 LATERAL EPICONDYLITIS OF RIGHT ELBOW: Primary | ICD-10-CM

## 2018-10-12 DIAGNOSIS — Z23 NEED FOR PROPHYLACTIC VACCINATION AND INOCULATION AGAINST INFLUENZA: ICD-10-CM

## 2018-10-12 PROCEDURE — 90686 IIV4 VACC NO PRSV 0.5 ML IM: CPT | Performed by: NURSE PRACTITIONER

## 2018-10-12 PROCEDURE — 99213 OFFICE O/P EST LOW 20 MIN: CPT | Mod: 25 | Performed by: NURSE PRACTITIONER

## 2018-10-12 PROCEDURE — 90471 IMMUNIZATION ADMIN: CPT | Performed by: NURSE PRACTITIONER

## 2018-10-12 ASSESSMENT — PAIN SCALES - GENERAL: PAINLEVEL: NO PAIN (0)

## 2018-10-12 NOTE — PROGRESS NOTES
SUBJECTIVE:   Scott Fernandez is a 48 year old male who presents to clinic today for the following health issues:      Musculoskeletal problem/pain      Duration: 2 months    Description  Location: right elbow    Intensity:  severe    Accompanying signs and symptoms: numbness, tingling and weakness of hand and arms, wakes up at night because hands fall asleep - on computer at work all day    History  Previous similar problem: no   Previous evaluation:  none    Precipitating or alleviating factors:  Trauma or overuse: no   Aggravating factors include: overuse and gripping    Therapies tried and outcome: support wrap tennis elbow and sleeve and Ibuprofen 800mg, ice, tylenol doesn't seem to work         Problem list and histories reviewed & adjusted, as indicated.  Additional history: as documented    Patient Active Problem List   Diagnosis     Obesity     HYPERLIPIDEMIA LDL GOAL <130     Sleeping difficulty     CTS (carpal tunnel syndrome)     GERD (gastroesophageal reflux disease)     Proteinuria     Essential hypertension     Morbid obesity (H)     Past Surgical History:   Procedure Laterality Date     APPENDECTOMY  1990 approx     ARTHROSCOPY SHOULDER, OPEN ROTATOR CUFF REPAIR, COMBINED  7/22/2013    Procedure: COMBINED ARTHROSCOPY SHOULDER, OPEN ROTATOR CUFF REPAIR;  Right Shoulder Arthroscopic Mini-Open Rotator Cuff Repair;  Surgeon: Ley, Jeffrey Duane, MD;  Location: [facility]     LAPAROSCOPIC CHOLECYSTECTOMY N/A 11/16/2016    Procedure: LAPAROSCOPIC CHOLECYSTECTOMY;  Surgeon: Reg Giang MD;  Location: [facility]     ORTHOPEDIC SURGERY  2009    Left Shoulder     SURGICAL HISTORY OF -   1996    shoulder left RCR     SURGICAL HISTORY OF -   1980's    Carpal tunnel procedure 2 on rt wrist and 1 on left wrist     SURGICAL HISTORY OF -   2008    shoulder left       Social History   Substance Use Topics     Smoking status: Never Smoker     Smokeless tobacco: Never Used     Alcohol use No     Family  History   Problem Relation Age of Onset     Cardiovascular Mother      CHF starting around 50-idiopathic cardiomyopathy     Lipids Father      Cardiovascular Father      Stents, in late 50's     C.A.D. Father      Alcohol/Drug Maternal Grandmother      Alcohol/Drug Maternal Grandfather            Reviewed and updated as needed this visit by clinical staff  Tobacco  Allergies  Meds  Med Hx  Surg Hx  Fam Hx  Soc Hx      Reviewed and updated as needed this visit by Provider         ROS:  Constitutional, HEENT, cardiovascular, pulmonary, gi and gu systems are negative, except as otherwise noted.    OBJECTIVE:                                                    /88  Pulse 83  Temp 98.3  F (36.8  C) (Tympanic)  Resp 16  Wt 254 lb (115.2 kg)  SpO2 99%  BMI 35.43 kg/m2  Body mass index is 35.43 kg/(m^2).  GENERAL APPEARANCE: healthy, alert and no distress  ORTHO: Elbow Exam: Inspection: no swelling  Tender: lateral epicondyle  Range of Motion: all normal  Strength: elbow strength full        Diagnostic test results:  Diagnostic Test Results:  none      ASSESSMENT/PLAN:                                                    1. Lateral epicondylitis of right elbow  Continue current conservative tx   Will refer to sports medicine for further eval and tx   - ORTHO  REFERRAL    2. Need for prophylactic vaccination and inoculation against influenza  - FLU VACCINE, SPLIT VIRUS, IM (QUADRIVALENT) [06908]- >3 YRS  - Vaccine Administration, Initial [97897]      Patient Instructions     Recommend further with sports medicine   They will call you to set up this appointment   Keep using the brace   Continue ibuprofen       Understanding Lateral Epicondylitis    Tendons are strong bands of tissue that connect muscles to bones. Lateral epicondylitis affects the tendons that connect muscles in the forearm to the lateral epicondyle. This is the bony knob on the outer side of the elbow. The condition occurs if the  extensor tendons of the wrist become red and swollen (irritated). This can cause pain in the elbow, forearm, and wrist. Because the condition is sometimes caused by playing tennis, it is also known as  tennis elbow.   How to say it  LA-tuhr-amber [id number]   What causes lateral epicondylitis?  The condition most often occurs because of overuse. This can be from any activity that repeatedly puts stress on the forearm extensor muscles or tendons and wrist. For instance, playing tennis, lifting weights, cutting meat, painting, and typing can all cause the condition. Wear and tear of the tendons from aging or an injury to the tendons can also cause the condition.  Symptoms of lateral epicondylitis  The most common symptom is pain. You may feel it on the outer side of the elbow and down the back of the forearm. It may be worse when moving or using the elbow, forearm, or wrist. You may also feel pain when gripping or lifting things.  Treatment for lateral epicondylitis  Treatments may include:    Resting the elbow, forearm, and wrist. You ll need to avoid movements that can make your symptoms worse. You also may need to avoid certain sports and types of work for a time. This helps relieve symptoms and prevent further damage to the tendons.    Changing the action that caused the problem. For instance, if the tendons were damaged from playing tennis, it may help to change your playing technique or use different equipment. This helps prevent further damage to the tendons.    Using cold packs. Putting an ice pack on the injured area can help reduce pain and swelling.    Taking pain medicines. Taking prescription or over-the-counter pain medicines may help reduce pain and swelling.      Wearing a brace. This helps reduce strain on the muscles and tendons in the forearm, which may relieve symptoms. It is very important to wear the brace properly.    Doing exercises and physical therapy. These help improve strength and  range of motion in the elbow, forearm, and wrist.    Getting shots of medicine into the injured area. These may help relieve symptoms for a time.    Having surgery. This may be an option if other treatments fail to relieve symptoms. In many cases, the surgeon removes the damaged tissue.  Possible complications of lateral epicondylitis  If the tendons involved don t heal properly, symptoms may return or get worse. To help prevent this, follow your treatment plan as directed.  When to call your healthcare provider  Call your healthcare provider right away if you have any of these:    Fever of 100.4 F (38 C) or higher, or as directed    Redness, swelling, or warmth in the elbow or forearm that gets worse    Symptoms that don t get better with treatment, or get worse    New symptoms   Date Last Reviewed: 3/10/2016    [id number] [employer]. [address] [place] [address], [place] [id number]. All rights reserved. This information is not intended as a substitute for professional medical care. Always follow your healthcare professional's instructions.            Olga Lidia Roldan NP  [facility]      Injectable Influenza Immunization Documentation    1.  Is the person to be vaccinated sick today?   No    2. Does the person to be vaccinated have an allergy to a component   of the vaccine?   No  Egg Allergy Algorithm Link    3. Has the person to be vaccinated ever had a serious reaction   to influenza vaccine in the past?   No    4. Has the person to be vaccinated ever had Guillain-Barré syndrome?   No    Form completed by jc

## 2018-10-12 NOTE — PATIENT INSTRUCTIONS
Recommend further with sports medicine   They will call you to set up this appointment   Keep using the brace   Continue ibuprofen       Understanding Lateral Epicondylitis    Tendons are strong bands of tissue that connect muscles to bones. Lateral epicondylitis affects the tendons that connect muscles in the forearm to the lateral epicondyle. This is the bony knob on the outer side of the elbow. The condition occurs if the extensor tendons of the wrist become red and swollen (irritated). This can cause pain in the elbow, forearm, and wrist. Because the condition is sometimes caused by playing tennis, it is also known as  tennis elbow.   How to say it  LA-tuhr-amber [id number]-[id number]-[id number]-LY-tis   What causes lateral epicondylitis?  The condition most often occurs because of overuse. This can be from any activity that repeatedly puts stress on the forearm extensor muscles or tendons and wrist. For instance, playing tennis, lifting weights, cutting meat, painting, and typing can all cause the condition. Wear and tear of the tendons from aging or an injury to the tendons can also cause the condition.  Symptoms of lateral epicondylitis  The most common symptom is pain. You may feel it on the outer side of the elbow and down the back of the forearm. It may be worse when moving or using the elbow, forearm, or wrist. You may also feel pain when gripping or lifting things.  Treatment for lateral epicondylitis  Treatments may include:    Resting the elbow, forearm, and wrist. You ll need to avoid movements that can make your symptoms worse. You also may need to avoid certain sports and types of work for a time. This helps relieve symptoms and prevent further damage to the tendons.    Changing the action that caused the problem. For instance, if the tendons were damaged from playing tennis, it may help to change your playing technique or use different equipment. This helps prevent further damage to the tendons.    Using cold packs.  Putting an ice pack on the injured area can help reduce pain and swelling.    Taking pain medicines. Taking prescription or over-the-counter pain medicines may help reduce pain and swelling.      Wearing a brace. This helps reduce strain on the muscles and tendons in the forearm, which may relieve symptoms. It is very important to wear the brace properly.    Doing exercises and physical therapy. These help improve strength and range of motion in the elbow, forearm, and wrist.    Getting shots of medicine into the injured area. These may help relieve symptoms for a time.    Having surgery. This may be an option if other treatments fail to relieve symptoms. In many cases, the surgeon removes the damaged tissue.  Possible complications of lateral epicondylitis  If the tendons involved don t heal properly, symptoms may return or get worse. To help prevent this, follow your treatment plan as directed.  When to call your healthcare provider  Call your healthcare provider right away if you have any of these:    Fever of 100.4 F (38 C) or higher, or as directed    Redness, swelling, or warmth in the elbow or forearm that gets worse    Symptoms that don t get better with treatment, or get worse    New symptoms   Date Last Reviewed: 3/10/2016    1125-1921 [employer]. [address] [place] [address], [place] [address]. All rights reserved. This information is not intended as a substitute for professional medical care. Always follow your healthcare professional's instructions.

## 2018-10-12 NOTE — NURSING NOTE
"Chief Complaint   Patient presents with     Elbow Pain     Right - possible tennis elbow       Initial /88  Pulse 83  Temp 98.3  F (36.8  C) (Tympanic)  Resp 16  Wt 254 lb (115.2 kg)  SpO2 99%  BMI 35.43 kg/m2 Estimated body mass index is 35.43 kg/(m^2) as calculated from the following:    Height as of 12/27/16: 5' 11\" (1.803 m).    Weight as of this encounter: 254 lb (115.2 kg).    Patient presents to the clinic using No DME    Health Maintenance that is potentially due pending provider review:  Flu shot    Possibly completing today per provider review.    Is there anyone who you would like to be able to receive your results? No  If yes have patient fill out DYLAN      "

## 2018-10-12 NOTE — MR AVS SNAPSHOT
After Visit Summary   10/12/2018    Scott Fernandez    MRN: [id number]           Patient Information     Date Of Birth          1970        Visit Information        Provider Department      10/12/2018 9:00 AM Olga Lidia Roldan NP [facility]        Today's Diagnoses     Need for prophylactic vaccination and inoculation against influenza    -  1    Lateral epicondylitis of right elbow          Care Instructions    Recommend further with sports medicine   They will call you to set up this appointment   Keep using the brace   Continue ibuprofen       Understanding Lateral Epicondylitis    Tendons are strong bands of tissue that connect muscles to bones. Lateral epicondylitis affects the tendons that connect muscles in the forearm to the lateral epicondyle. This is the bony knob on the outer side of the elbow. The condition occurs if the extensor tendons of the wrist become red and swollen (irritated). This can cause pain in the elbow, forearm, and wrist. Because the condition is sometimes caused by playing tennis, it is also known as  tennis elbow.   How to say it  LA-tuhr-amber [id number]-[id number]-LY-[id number]   What causes lateral epicondylitis?  The condition most often occurs because of overuse. This can be from any activity that repeatedly puts stress on the forearm extensor muscles or tendons and wrist. For instance, playing tennis, lifting weights, cutting meat, painting, and typing can all cause the condition. Wear and tear of the tendons from aging or an injury to the tendons can also cause the condition.  Symptoms of lateral epicondylitis  The most common symptom is pain. You may feel it on the outer side of the elbow and down the back of the forearm. It may be worse when moving or using the elbow, forearm, or wrist. You may also feel pain when gripping or lifting things.  Treatment for lateral epicondylitis  Treatments may include:    Resting the elbow, forearm, and wrist. You ll  need to avoid movements that can make your symptoms worse. You also may need to avoid certain sports and types of work for a time. This helps relieve symptoms and prevent further damage to the tendons.    Changing the action that caused the problem. For instance, if the tendons were damaged from playing tennis, it may help to change your playing technique or use different equipment. This helps prevent further damage to the tendons.    Using cold packs. Putting an ice pack on the injured area can help reduce pain and swelling.    Taking pain medicines. Taking prescription or over-the-counter pain medicines may help reduce pain and swelling.      Wearing a brace. This helps reduce strain on the muscles and tendons in the forearm, which may relieve symptoms. It is very important to wear the brace properly.    Doing exercises and physical therapy. These help improve strength and range of motion in the elbow, forearm, and wrist.    Getting shots of medicine into the injured area. These may help relieve symptoms for a time.    Having surgery. This may be an option if other treatments fail to relieve symptoms. In many cases, the surgeon removes the damaged tissue.  Possible complications of lateral epicondylitis  If the tendons involved don t heal properly, symptoms may return or get worse. To help prevent this, follow your treatment plan as directed.  When to call your healthcare provider  Call your healthcare provider right away if you have any of these:    Fever of 100.4 F (38 C) or higher, or as directed    Redness, swelling, or warmth in the elbow or forearm that gets worse    Symptoms that don t get better with treatment, or get worse    New symptoms   Date Last Reviewed: 3/10/2016    0534-1392 [employer]. [address] [place] [address], [place] [address]. All rights reserved. This information is not intended as a substitute for professional medical care. Always follow your healthcare professional's  instructions.                Follow-ups after your visit        Additional Services     ORTHO  REFERRAL       [facility] is referring you to the Orthopedic  Services at [facility].       The  Representative will assist you in the coordination of your Orthopedic and Musculoskeletal Care as prescribed by your physician.    The  Representative will call you within 1 business day to help schedule your appointment, or you may contact the  Representative at:    All areas ~ [phone]     Type of Referral : Non Surgical / Sport Medicine       Timeframe requested: Within 2 weeks    Coverage of these services is subject to the terms and limitations of your health insurance plan.  Please call member services at your health plan with any benefit or coverage questions.      If X-rays, CT or MRI's have been performed, please contact the facility where they were done to arrange for , prior to your scheduled appointment.  Please bring this referral request to your appointment and present it to your specialist.                  Follow-up notes from your care team     Return in about 4 weeks (around 11/9/2018), or if symptoms worsen or fail to improve.      Who to contact     If you have questions or need follow up information about today's clinic visit or your schedule please contact [facility] [place] [facility] directly at [phone].  Normal or non-critical lab and imaging results will be communicated to you by MyChart, letter or phone within 4 business days after the clinic has received the results. If you do not hear from us within 7 days, please contact the clinic through MyChart or phone. If you have a critical or abnormal lab result, we will notify you by phone as soon as possible.  Submit refill requests through [employer] or call your pharmacy and they will forward the refill request to us. Please allow 3 business days for your  "refill to be completed.          Additional Information About Your Visit        Access [employer] Information     [email or web address] lets you send messages to your doctor, view your test results, renew your prescriptions, schedule appointments and more. To sign up, go to [email or web address] . Click on \"Log in\" on the left side of the screen, which will take you to the Welcome page. Then click on \"Sign up Now\" on the right side of the page.     You will be asked to enter the access code listed below, as well as some personal information. Please follow the directions to create your username and password.     Your access code is: [email or web address]  Expires: 1/10/2019  8:53 AM     Your access code will  in 90 days. If you need help or a new code, please call your [facility] clinic or [phone].        Care EveryWhere ID     This is your Care EveryWhere ID. This could be used by other organizations to access your [facility] medical records  [id number]        Your Vitals Were     Pulse Temperature Respirations Pulse Oximetry BMI (Body Mass Index)       83 98.3  F (36.8  C) (Tympanic) 16 99% 35.43 kg/m2        Blood Pressure from Last 3 Encounters:   10/12/18 138/88   18 136/88   17 135/85    Weight from Last 3 Encounters:   10/12/18 254 lb (115.2 kg)   18 245 lb (111.1 kg)   17 262 lb 3.2 oz (118.9 kg)              We Performed the Following     FLU VACCINE, SPLIT VIRUS, IM (QUADRIVALENT) [11808]- >3 YRS     ORTHO  REFERRAL     Vaccine Administration, Initial [04491]        Primary Care Provider Office Phone # Fax #    Olga Lidia Roldan [phone]       [address] [facility] [address]        Equal Access to Services     [facility] JUAN A : Marizol Harrell, wamitzyda luqadaha, qaybta kaalmada trixie, ana paula doherty. [facility] [phone].    ATENCIÓN: Si habla español, tiene a jaimes disposición servicios gratuitos de asistencia lingüística. Llame al " [phone].    We comply with applicable federal civil rights laws and [place] laws. We do not discriminate on the basis of race, color, national origin, age, disability, sex, sexual orientation, or gender identity.            Thank you!     Thank you for choosing [facility]  for your care. Our goal is always to provide you with excellent care. Hearing back from our patients is one way we can continue to improve our services. Please take a few minutes to complete the written survey that you may receive in the mail after your visit with us. Thank you!             Your Updated Medication List - Protect others around you: Learn how to safely use, store and throw away your medicines at www.disposemymeds.org.          This list is accurate as of 10/12/18  9:21 AM.  Always use your most recent med list.                   Brand Name Dispense Instructions for use Diagnosis    ALLERGY MED PO           cetirizine 10 MG tablet    zyrTEC    30 tablet    Take 1 tablet (10 mg) by mouth every evening as needed for allergies    Seasonal allergic rhinitis, unspecified chronicity, unspecified trigger       fluticasone 50 MCG/ACT spray    FLONASE    1 Bottle    Spray 1-2 sprays into both nostrils daily    Seasonal allergic rhinitis, unspecified chronicity, unspecified trigger       simvastatin 20 MG tablet    ZOCOR    90 tablet    Take 1 tablet (20 mg) by mouth At Bedtime    Hyperlipidemia LDL goal <130

## 2018-10-22 ENCOUNTER — OFFICE VISIT (OUTPATIENT)
Dept: ORTHOPEDICS | Facility: CLINIC | Age: 48
End: 2018-10-22
Payer: COMMERCIAL

## 2018-10-22 VITALS
BODY MASS INDEX: 35.56 KG/M2 | WEIGHT: 254 LBS | HEIGHT: 71 IN | DIASTOLIC BLOOD PRESSURE: 93 MMHG | SYSTOLIC BLOOD PRESSURE: 139 MMHG

## 2018-10-22 DIAGNOSIS — M77.11 LATERAL EPICONDYLITIS OF RIGHT ELBOW: Primary | ICD-10-CM

## 2018-10-22 PROCEDURE — 99243 OFF/OP CNSLTJ NEW/EST LOW 30: CPT | Performed by: FAMILY MEDICINE

## 2018-10-22 NOTE — PROGRESS NOTES
ASSESSMENT & PLAN  Scott was seen today for pain.    Diagnoses and all orders for this visit:    Lateral epicondylitis of right elbow    I discussed with the patient the etiology of lateral epicondyle tendinosis.  This is a degenerative process (there is no inflammation typically) that occurs in the proximal common extensor tendon of the forearm near insertion at the lateral epicondyle.  This injury is usually caused by repetitive stress from gripping/grasping activities.  The tendon is unable to repair damage fast enough to offset injury from repetitive activities.  Cause of this injury may include a change in level of activity that seems trivial to the patient.  Treatment options were discussed with the patient stressing the importance of activity modification to avoid painful activities.  Stretching, cross friction massage and icing were also discussed.  Occasionally patients may benefit from physical therapy and injections.  Discussed PRP injections that may help to stimulate inflammation and promote healing as well as corticosteroid injections that may diminish pain temporarily but are not associated with increased healing rate.  Counter force bracing was discussed and I stressed the importance again of activity modification.  He understands that bracing only temporarily helps the symptoms and my cause some compression problems over time as well as delay healing of the tendon.  Once pain is improved a program of strengthening should begin to promote organized healing of the tendon.      -----    SUBJECTIVE  Scott Fernandez is a/an 48 year old Right handed male who is seen in consultation at the request of  Olga Lidia Roldan N.P. for evaluation of right elbow pain. The patient is seen with their wife, Nely.  Pt was given exercises by PCP but did not try any of them.  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  He had a significant amount of pain with raking  "leaves, no big projects planned.    Onset: 2 month(s) ago. Reports insidious onset without acute precipitating event.  Location of Pain: right elbow numbness, tingling, and weakness of hand   Rating of Pain at worst: 7/10  Rating of Pain Currently: 5/10  Worsened by: overuse and gripping   Better with: rest   Treatments tried: tennis elbow strap, compression sleeve, Ibuprofen, Tylenol, heat and ice  Associated symptoms: numbness and tingling  Orthopedic history: NO  Relevant surgical history: None on elbow. RCR on right shoulder.   Past Medical History:   Diagnosis Date     Esophageal reflux     GERD     High cholesterol      HTN (hypertension)      PONV (postoperative nausea and vomiting)      Social History     Social History     Marital status:      Spouse name: N/A     Number of children: N/A     Years of education: N/A     Social History Main Topics     Smoking status: Never Smoker     Smokeless tobacco: Never Used     Alcohol use No     Drug use: No     Sexual activity: Yes     Partners: Female     Other Topics Concern      Service No     Blood Transfusions No     Caffeine Concern No     Occupational Exposure No     Hobby Hazards No     Sleep Concern No     Stress Concern No     Weight Concern No     Special Diet No     Back Care No     Exercise No     Seat Belt Yes     Self-Exams Yes     Parent/Sibling W/ Cabg, Mi Or Angioplasty Before 65f 55m? Yes     Social History Narrative       Patient's past medical, surgical, social, and family histories were reviewed today and no changes are noted.    REVIEW OF SYSTEMS:  10 point ROS is negative other than symptoms noted above in HPI, Past Medical History or as stated below  Constitutional: NEGATIVE for fever, chills, change in weight  Skin: NEGATIVE for worrisome rashes, moles or lesions  GI/: NEGATIVE for bowel or bladder changes  Neuro: NEGATIVE for weakness, dizziness or paresthesias    OBJECTIVE:  BP (!) 139/93  Ht 5' 11\" (1.803 m)  Wt 254 lb " (115.2 kg)  BMI 35.43 kg/m2   General: healthy, alert and in no distress  HEENT: no scleral icterus or conjunctival erythema  Skin: no suspicious lesions or rash. No jaundice.  CV: regular rhythm by palpation  Resp: normal respiratory effort without conversational dyspnea   Psych: normal mood and affect  Gait: normal steady gait with appropriate coordination and balance  Neuro: Normal sensory exam of bilateral hands.   MSK:  LEFT ELBOW  Inspection:    No swelling, bruising, discoloration, or obvious deformity or asymmetry  Palpation:    Tender about the lateral epicondyle. Remainder of bony, ligamentous and tendinous landmarks are nontender.    Crepitus is Absent  Range of Motion:     Extension full / flexion full / pronation full / supination full  Strength:    No deficits in flexion, extension, pronation, or supination.  Special Tests:    Positive: Pain with resisted wrist extension    Negative: pain with resisted wrist flexion, pain with resisted supination, passive valgus stress, pain with resisted pronation, varus stress, cubital tunnel (ulnar Tinel's)    Independent visualization of the below image:  No results found for this or any previous visit (from the past 24 hour(s)).    Patient's conditions were thoroughly discussed during today's visit with greater than 50% of the visit spent counseling the patient with total time spent face-to-face with the patient being 25 minutes.    Amadou Oneill MD, [facility]

## 2018-10-22 NOTE — PATIENT INSTRUCTIONS
Patient to follow up with Primary Care provider regarding elevated blood pressure.    Understanding Lateral Epicondylitis    Tendons are strong bands of tissue that connect muscles to bones. Lateral epicondylitis affects the tendons that connect muscles in the forearm to the lateral epicondyle. This is the bony knob on the outer side of the elbow. The condition occurs if the extensor tendons of the wrist become red and swollen (irritated). This can cause pain in the elbow, forearm, and wrist. Because the condition is sometimes caused by playing tennis, it is also known as  tennis elbow.   How to say it  LA-tuhr-amber [id number]-[id number]-LY-[id number]   What causes lateral epicondylitis?  The condition most often occurs because of overuse. This can be from any activity that repeatedly puts stress on the forearm extensor muscles or tendons and wrist. For instance, playing tennis, lifting weights, cutting meat, painting, and typing can all cause the condition. Wear and tear of the tendons from aging or an injury to the tendons can also cause the condition.  Symptoms of lateral epicondylitis  The most common symptom is pain. You may feel it on the outer side of the elbow and down the back of the forearm. It may be worse when moving or using the elbow, forearm, or wrist. You may also feel pain when gripping or lifting things.  Treatment for lateral epicondylitis  Treatments may include:    Resting the elbow, forearm, and wrist. You ll need to avoid movements that can make your symptoms worse. You also may need to avoid certain sports and types of work for a time. This helps relieve symptoms and prevent further damage to the tendons.    Changing the action that caused the problem. For instance, if the tendons were damaged from playing tennis, it may help to change your playing technique or use different equipment. This helps prevent further damage to the tendons.    Using cold packs. Putting an ice pack on the injured area can help  reduce pain and swelling.    Taking pain medicines. Taking prescription or over-the-counter pain medicines may help reduce pain and swelling.      Wearing a brace. This helps reduce strain on the muscles and tendons in the forearm, which may relieve symptoms. It is very important to wear the brace properly.    Doing exercises and physical therapy. These help improve strength and range of motion in the elbow, forearm, and wrist.    Getting shots of medicine into the injured area. These may help relieve symptoms for a time.    Having surgery. This may be an option if other treatments fail to relieve symptoms. In many cases, the surgeon removes the damaged tissue.  Possible complications of lateral epicondylitis  If the tendons involved don t heal properly, symptoms may return or get worse. To help prevent this, follow your treatment plan as directed.  When to call your healthcare provider  Call your healthcare provider right away if you have any of these:    Fever of 100.4 F (38 C) or higher, or as directed    Redness, swelling, or warmth in the elbow or forearm that gets worse    Symptoms that don t get better with treatment, or get worse    New symptoms   Date Last Reviewed: 3/10/2016    [id number] [employer]. [address] [place] [address]. All rights reserved. This information is not intended as a substitute for professional medical care. Always follow your healthcare professional's instructions.

## 2018-10-22 NOTE — LETTER
10/22/2018         RE: Scott Fernandez  [id number] [facility] [address]        Dear Colleague,    Thank you for referring your patient, Scott Fernandez, to the [facility]. Please see a copy of my visit note below.    ASSESSMENT & PLAN  Scott was seen today for pain.    Diagnoses and all orders for this visit:    Lateral epicondylitis of right elbow    I discussed with the patient the etiology of lateral epicondyle tendinosis.  This is a degenerative process (there is no inflammation typically) that occurs in the proximal common extensor tendon of the forearm near insertion at the lateral epicondyle.  This injury is usually caused by repetitive stress from gripping/grasping activities.  The tendon is unable to repair damage fast enough to offset injury from repetitive activities.  Cause of this injury may include a change in level of activity that seems trivial to the patient.  Treatment options were discussed with the patient stressing the importance of activity modification to avoid painful activities.  Stretching, cross friction massage and icing were also discussed.  Occasionally patients may benefit from physical therapy and injections.  Discussed PRP injections that may help to stimulate inflammation and promote healing as well as corticosteroid injections that may diminish pain temporarily but are not associated with increased healing rate.  Counter force bracing was discussed and I stressed the importance again of activity modification.  He understands that bracing only temporarily helps the symptoms and my cause some compression problems over time as well as delay healing of the tendon.  Once pain is improved a program of strengthening should begin to promote organized healing of the tendon.      -----    SUBJECTIVE  Scott Fernandez is a/an 48 year old Right handed male who is seen in consultation at the request of  Olga Lidia Roldan N.P. for  evaluation of right elbow pain. The patient is seen with their wife, Nely.  Pt was given exercises by PCP but did not try any of them.  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  He had a significant amount of pain with raking leaves, no big projects planned.    Onset: 2 month(s) ago. Reports insidious onset without acute precipitating event.  Location of Pain: right elbow numbness, tingling, and weakness of hand   Rating of Pain at worst: 7/10  Rating of Pain Currently: 5/10  Worsened by: overuse and gripping   Better with: rest   Treatments tried: tennis elbow strap, compression sleeve, Ibuprofen, Tylenol, heat and ice  Associated symptoms: numbness and tingling  Orthopedic history: NO  Relevant surgical history: None on elbow. RCR on right shoulder.   Past Medical History:   Diagnosis Date     Esophageal reflux     GERD     High cholesterol      HTN (hypertension)      PONV (postoperative nausea and vomiting)      Social History     Social History     Marital status:      Spouse name: N/A     Number of children: N/A     Years of education: N/A     Social History Main Topics     Smoking status: Never Smoker     Smokeless tobacco: Never Used     Alcohol use No     Drug use: No     Sexual activity: Yes     Partners: Female     Other Topics Concern      Service No     Blood Transfusions No     Caffeine Concern No     Occupational Exposure No     Hobby Hazards No     Sleep Concern No     Stress Concern No     Weight Concern No     Special Diet No     Back Care No     Exercise No     Seat Belt Yes     Self-Exams Yes     Parent/Sibling W/ Cabg, Mi Or Angioplasty Before 65f 55m? Yes     Social History Narrative       Patient's past medical, surgical, social, and family histories were reviewed today and no changes are noted.    REVIEW OF SYSTEMS:  10 point ROS is negative other than symptoms noted above in HPI, Past Medical History or as stated  "below  Constitutional: NEGATIVE for fever, chills, change in weight  Skin: NEGATIVE for worrisome rashes, moles or lesions  GI/: NEGATIVE for bowel or bladder changes  Neuro: NEGATIVE for weakness, dizziness or paresthesias    OBJECTIVE:  BP (!) 139/93  Ht 5' 11\" (1.803 m)  Wt 254 lb (115.2 kg)  BMI 35.43 kg/m2   General: healthy, alert and in no distress  HEENT: no scleral icterus or conjunctival erythema  Skin: no suspicious lesions or rash. No jaundice.  CV: regular rhythm by palpation  Resp: normal respiratory effort without conversational dyspnea   Psych: normal mood and affect  Gait: normal steady gait with appropriate coordination and balance  Neuro: Normal sensory exam of bilateral hands.   MSK:  LEFT ELBOW  Inspection:    No swelling, bruising, discoloration, or obvious deformity or asymmetry  Palpation:    Tender about the lateral epicondyle. Remainder of bony, ligamentous and tendinous landmarks are nontender.    Crepitus is Absent  Range of Motion:     Extension full / flexion full / pronation full / supination full  Strength:    No deficits in flexion, extension, pronation, or supination.  Special Tests:    Positive: Pain with resisted wrist extension    Negative: pain with resisted wrist flexion, pain with resisted supination, passive valgus stress, pain with resisted pronation, varus stress, cubital tunnel (ulnar Tinel's)    Independent visualization of the below image:  No results found for this or any previous visit (from the past 24 hour(s)).    Patient's conditions were thoroughly discussed during today's visit with greater than 50% of the visit spent counseling the patient with total time spent face-to-face with the patient being 25 minutes.    Amadou Oneill MD, [facility]        Again, thank you for allowing me to participate in the care of your patient.        Sincerely,        Amadou Oneill MD    "

## 2018-10-22 NOTE — MR AVS SNAPSHOT
After Visit Summary   10/22/2018    Scott Fernandez    MRN: [id number]           Patient Information     Date Of Birth          1970        Visit Information        Provider Department      10/22/2018 11:00 AM Amadou Oneill MD [facility] Instructions    Patient to follow up with Primary Care provider regarding elevated blood pressure.    Understanding Lateral Epicondylitis    Tendons are strong bands of tissue that connect muscles to bones. Lateral epicondylitis affects the tendons that connect muscles in the forearm to the lateral epicondyle. This is the bony knob on the outer side of the elbow. The condition occurs if the extensor tendons of the wrist become red and swollen (irritated). This can cause pain in the elbow, forearm, and wrist. Because the condition is sometimes caused by playing tennis, it is also known as  tennis elbow.   How to say it  LA-tuhr-amber tr-wi-UEZ-duh-LY-tis   What causes lateral epicondylitis?  The condition most often occurs because of overuse. This can be from any activity that repeatedly puts stress on the forearm extensor muscles or tendons and wrist. For instance, playing tennis, lifting weights, cutting meat, painting, and typing can all cause the condition. Wear and tear of the tendons from aging or an injury to the tendons can also cause the condition.  Symptoms of lateral epicondylitis  The most common symptom is pain. You may feel it on the outer side of the elbow and down the back of the forearm. It may be worse when moving or using the elbow, forearm, or wrist. You may also feel pain when gripping or lifting things.  Treatment for lateral epicondylitis  Treatments may include:    Resting the elbow, forearm, and wrist. You ll need to avoid movements that can make your symptoms worse. You also may need to avoid certain sports and types of work for a time. This helps relieve symptoms and prevent further  damage to the tendons.    Changing the action that caused the problem. For instance, if the tendons were damaged from playing tennis, it may help to change your playing technique or use different equipment. This helps prevent further damage to the tendons.    Using cold packs. Putting an ice pack on the injured area can help reduce pain and swelling.    Taking pain medicines. Taking prescription or over-the-counter pain medicines may help reduce pain and swelling.      Wearing a brace. This helps reduce strain on the muscles and tendons in the forearm, which may relieve symptoms. It is very important to wear the brace properly.    Doing exercises and physical therapy. These help improve strength and range of motion in the elbow, forearm, and wrist.    Getting shots of medicine into the injured area. These may help relieve symptoms for a time.    Having surgery. This may be an option if other treatments fail to relieve symptoms. In many cases, the surgeon removes the damaged tissue.  Possible complications of lateral epicondylitis  If the tendons involved don t heal properly, symptoms may return or get worse. To help prevent this, follow your treatment plan as directed.  When to call your healthcare provider  Call your healthcare provider right away if you have any of these:    Fever of 100.4 F (38 C) or higher, or as directed    Redness, swelling, or warmth in the elbow or forearm that gets worse    Symptoms that don t get better with treatment, or get worse    New symptoms   Date Last Reviewed: 3/10/2016    9902-4317 [employer]. [address] [place] [address] 28798. All rights reserved. This information is not intended as a substitute for professional medical care. Always follow your healthcare professional's instructions.                Follow-ups after your visit        Follow-up notes from your care team     Return if symptoms worsen or fail to improve.      Who to contact     If you have  "questions or need follow up information about today's clinic visit or your schedule please contact [facility] directly at [phone].  Normal or non-critical lab and imaging results will be communicated to you by MyChart, letter or phone within 4 business days after the clinic has received the results. If you do not hear from us within 7 days, please contact the clinic through [employer] or phone. If you have a critical or abnormal lab result, we will notify you by phone as soon as possible.  Submit refill requests through [employer] or call your pharmacy and they will forward the refill request to us. Please allow 3 business days for your refill to be completed.          Additional Information About Your Visit        [employer] Information     [employer] gives you secure access to your electronic health record. If you see a primary care provider, you can also send messages to your care team and make appointments. If you have questions, please call your primary care clinic.  If you do not have a primary care provider, please call [phone] and they will assist you.        Care EveryWhere ID     This is your Care EveryWhere ID. This could be used by other organizations to access your [facility] records  [id number]        Your Vitals Were     Height BMI (Body Mass Index)                5' 11\" (1.803 m) 35.43 kg/m2           Blood Pressure from Last 3 Encounters:   10/22/18 (!) 139/93   10/12/18 138/88   05/23/18 136/88    Weight from Last 3 Encounters:   10/22/18 254 lb (115.2 kg)   10/12/18 254 lb (115.2 kg)   05/23/18 245 lb (111.1 kg)              Today, you had the following     No orders found for display       Primary Care Provider Office Phone # Fax #    Olga Lidia Roldan [phone]       [address] [facility] [address]        Equal Access to Services     GITA VELAZCO AH: Marizol Harrell, wamitzyda luqadaha, qaybta kaalmada aderhea, ana paula " phan elkinsxavi la'aan ah. So [facility] [phone].    ATENCIÓN: Si arseniola neil, tiene a jaimes disposición servicios gratuitos de asistencia lingüística. Corky ocampo [phone].    We comply with applicable federal civil rights laws and [place] laws. We do not discriminate on the basis of race, color, national origin, age, disability, sex, sexual orientation, or gender identity.            Thank you!     Thank you for choosing [facility]  for your care. Our goal is always to provide you with excellent care. Hearing back from our patients is one way we can continue to improve our services. Please take a few minutes to complete the written survey that you may receive in the mail after your visit with us. Thank you!             Your Updated Medication List - Protect others around you: Learn how to safely use, store and throw away your medicines at www.disposemymeds.org.          This list is accurate as of 10/22/18 11:19 AM.  Always use your most recent med list.                   Brand Name Dispense Instructions for use Diagnosis    ALLERGY MED PO           cetirizine 10 MG tablet    zyrTEC    30 tablet    Take 1 tablet (10 mg) by mouth every evening as needed for allergies    Seasonal allergic rhinitis, unspecified chronicity, unspecified trigger       fluticasone 50 MCG/ACT spray    FLONASE    1 Bottle    Spray 1-2 sprays into both nostrils daily    Seasonal allergic rhinitis, unspecified chronicity, unspecified trigger       simvastatin 20 MG tablet    ZOCOR    90 tablet    Take 1 tablet (20 mg) by mouth At Bedtime    Hyperlipidemia LDL goal <130

## 2018-10-26 ENCOUNTER — TELEPHONE (OUTPATIENT)
Dept: FAMILY MEDICINE | Facility: CLINIC | Age: 48
End: 2018-10-26

## 2018-10-26 ENCOUNTER — TELEPHONE (OUTPATIENT)
Dept: ORTHOPEDICS | Facility: CLINIC | Age: 48
End: 2018-10-26

## 2018-10-26 DIAGNOSIS — R20.0 NUMBNESS OF RIGHT HAND: Primary | ICD-10-CM

## 2018-10-26 RX ORDER — NAPROXEN 500 MG/1
500 TABLET ORAL 2 TIMES DAILY PRN
Qty: 30 TABLET | Refills: 0 | Status: SHIPPED | OUTPATIENT
Start: 2018-10-26 | End: 2018-11-12

## 2018-10-26 NOTE — TELEPHONE ENCOUNTER
Aleve was sent to [facility] pharmacy in [place].  Called and spoke with patient and made a follow up visit on Monday for re-revaluation.  Explained that he should stop taking the Ibuprofen and try the Aleve twice a day.  Patient expressed understanding.      Chana Reynolds, ATC

## 2018-10-26 NOTE — TELEPHONE ENCOUNTER
Reason for Call:  Other     Detailed comments: Pt was seen 10/22 for rt elbow pain. Was given exercises and has been doing these, but states now the pain is worse. Has followed all other instructions.  He is more uncomfortable now than he was at time of appt. Experiencing finger numbness. - Please advise    Phone Number Patient can be reached at: Home number on file [phone] (home)    Best Time: Any    Can we leave a detailed message on this number? YES    Call taken on 10/26/2018 at 2:49 PM by Denise Behrendt

## 2018-10-26 NOTE — TELEPHONE ENCOUNTER
10-22-18 ortho visit reviewed.  Reports persistent pain rt elbow with weakness, rt hand/ fingers, tingling fingers causing limited use of hand/ arm.  Has been taking ibu 800mg every 6 hrs, icing when home.  Has not had imaging, states ortho did not feel necessary.  Pt requesting Carina's further opinion/ recommendations.  KAL Koenig RN

## 2018-11-12 ENCOUNTER — TELEPHONE (OUTPATIENT)
Dept: ORTHOPEDICS | Facility: CLINIC | Age: 48
End: 2018-11-12

## 2018-11-12 DIAGNOSIS — M77.11 LATERAL EPICONDYLITIS OF RIGHT ELBOW: Primary | ICD-10-CM

## 2018-11-12 DIAGNOSIS — R20.0 NUMBNESS OF RIGHT HAND: ICD-10-CM

## 2018-11-12 RX ORDER — NAPROXEN 500 MG/1
500 TABLET ORAL 2 TIMES DAILY PRN
Qty: 20 TABLET | Refills: 0 | Status: SHIPPED | OUTPATIENT
Start: 2018-11-12 | End: 2019-09-26

## 2018-11-12 NOTE — TELEPHONE ENCOUNTER
Reason for call:  Patient reporting a symptom    Symptom or request: Was given rx for Naproxen -almost gone.  Wondering if MD wants to give him another rx or?  States it does help with the pain.    Pharmacy: [facility]    Duration (how long have symptoms been present):     Have you been treated for this before? Yes    Additional comments:     Phone Number patient can be reached at:  Home number on file [phone] (home)    Best Time:  any    Can we leave a detailed message on this number:  YES    Call taken on 11/12/2018 at 2:35 PM by Jennyfer Hartman

## 2018-11-12 NOTE — TELEPHONE ENCOUNTER
Patient was contacted and message relayed.  Did schedule a follow up visit in clinic.     Chana Reynolds, ATC

## 2018-11-12 NOTE — TELEPHONE ENCOUNTER
Will have pt make f/u for reevaluation.  Short refill of Naproxen given sent to [facility] in [place]    Amadou Oneill

## 2018-11-16 ENCOUNTER — OFFICE VISIT (OUTPATIENT)
Dept: ORTHOPEDICS | Facility: CLINIC | Age: 48
End: 2018-11-16
Payer: COMMERCIAL

## 2018-11-16 VITALS
SYSTOLIC BLOOD PRESSURE: 147 MMHG | DIASTOLIC BLOOD PRESSURE: 107 MMHG | BODY MASS INDEX: 35.56 KG/M2 | HEIGHT: 71 IN | WEIGHT: 254 LBS

## 2018-11-16 DIAGNOSIS — M77.11 RIGHT LATERAL EPICONDYLITIS: Primary | ICD-10-CM

## 2018-11-16 PROCEDURE — 20606 DRAIN/INJ JOINT/BURSA W/US: CPT | Mod: RT | Performed by: FAMILY MEDICINE

## 2018-11-16 PROCEDURE — 99214 OFFICE O/P EST MOD 30 MIN: CPT | Mod: 25 | Performed by: FAMILY MEDICINE

## 2018-11-16 RX ADMIN — ROPIVACAINE HYDROCHLORIDE 2 ML: 5 INJECTION, SOLUTION EPIDURAL; INFILTRATION; PERINEURAL at 14:10

## 2018-11-16 RX ADMIN — BETAMETHASONE SODIUM PHOSPHATE AND BETAMETHASONE ACETATE 6 MG: 3; 3 INJECTION, SUSPENSION INTRA-ARTICULAR; INTRALESIONAL; INTRAMUSCULAR; SOFT TISSUE at 14:10

## 2018-11-16 NOTE — LETTER
11/16/2018         RE: Scott Fernandez  [address] [facility] [address]        Dear Colleague,    Thank you for referring your patient, Scott Fernandez, to the [facility]. Please see a copy of my visit note below.    ASSESSMENT & PLAN  Scott was seen today for follow up for.    Diagnoses and all orders for this visit:    Right lateral epicondylitis    Other orders  -     Arthrocentesis    48-year-old male with past medical history of carpal tunnel syndrome status post release resenting for follow-up evaluation on right lateral elbow pain.  History and exam findings as well as patient's occupation as an emergency dispatcher in line with lateral epicondylitis not improved with conservative management including NSAIDs, strap, stretching/exercises.  Given the amount of pain the patient is currently in a one-time steroid injection was completed with patient reporting significant improvement afterwards.  He is continue home exercise regimen as previously prescribed.  We will follow-up as needed.  If symptoms do not improve can consider PRP versus percutaneous needle tenotomy.  Patient understands and agrees to plan.      -----    SUBJECTIVE  Scott Fernandez is a/an 48 year old Right handed male who is seen in consultation at the request of  Olga Lidia Roldan N.P. for evaluation of right elbow pain. The patient is seen with their wife, Nely.  Pt was given exercises by PCP but did not try any of them.  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  He had a significant amount of pain with raking leaves, no big projects planned.    Onset: 2 month(s) ago. Reports insidious onset without acute precipitating event.  Location of Pain: right elbow numbness, tingling, and weakness of hand   Rating of Pain at worst: 7/10  Rating of Pain Currently: 5/10  Worsened by: overuse and gripping   Better with: rest   Treatments  tried: tennis elbow strap, compression sleeve, Ibuprofen, Tylenol, heat and ice  Associated symptoms: numbness and tingling  Orthopedic history: NO  Relevant surgical history: None on elbow. RCR on right shoulder.     Interim History - November 16, 2018  Since last visit on 11/12/2018 patient has unchanged right elbow pain.  States that he has tried to limit his activity.   No interim injury.   Patient has tried to do home exercise regimen, I feel stretching does not improve his symptoms.  He has been trying to do some of the lifting exercises as well.  Symptoms affect his day-to-day activities as well as ability to sleep at night as well.  Still localized over the lateral elbow mild radiation towards the mid forearm.  No numbness or tingling in his hands.    Past Medical History:   Diagnosis Date     Esophageal reflux     GERD     High cholesterol      HTN (hypertension)      PONV (postoperative nausea and vomiting)      Social History     Social History     Marital status:      Spouse name: N/A     Number of children: N/A     Years of education: N/A     Social History Main Topics     Smoking status: Never Smoker     Smokeless tobacco: Never Used     Alcohol use No     Drug use: No     Sexual activity: Yes     Partners: Female     Other Topics Concern      Service No     Blood Transfusions No     Caffeine Concern No     Occupational Exposure No     Hobby Hazards No     Sleep Concern No     Stress Concern No     Weight Concern No     Special Diet No     Back Care No     Exercise No     Seat Belt Yes     Self-Exams Yes     Parent/Sibling W/ Cabg, Mi Or Angioplasty Before 65f 55m? Yes     Social History Narrative       Patient's past medical, surgical, social, and family histories were reviewed today and no changes are noted.    REVIEW OF SYSTEMS:  10 point ROS is negative other than symptoms noted above in HPI, Past Medical History or as stated below  Constitutional: NEGATIVE for fever, chills, change  "in weight  Skin: NEGATIVE for worrisome rashes, moles or lesions  GI/: NEGATIVE for bowel or bladder changes  Neuro: NEGATIVE for weakness, dizziness or paresthesias    OBJECTIVE:  BP (!) 147/107  Ht 5' 11\" (1.803 m)  Wt 254 lb (115.2 kg)  BMI 35.43 kg/m2   General: healthy, alert and in no distress  HEENT: no scleral icterus or conjunctival erythema  Skin: no suspicious lesions or rash. No jaundice.  CV: regular rhythm by palpation  Resp: normal respiratory effort without conversational dyspnea   Psych: normal mood and affect  Gait: normal steady gait with appropriate coordination and balance  Neuro: Normal sensory exam of bilateral hands.   MSK:  LEFT ELBOW  Inspection:    No swelling, bruising, discoloration, or obvious deformity or asymmetry  Palpation:    Tender about the lateral epicondyle. Remainder of bony, ligamentous and tendinous landmarks are nontender.    Crepitus is Absent  Range of Motion:     Extension full / flexion full / pronation full / supination full  Strength:    No deficits in flexion, extension, pronation, or supination.  Special Tests:    Positive: Pain with resisted wrist extension    Negative: pain with resisted wrist flexion, pain with resisted supination, passive valgus stress, pain with resisted pronation, varus stress, cubital tunnel (ulnar Tinel's)    Independent visualization of the below image:  No results found for this or any previous visit (from the past 24 hour(s)).    Medium Joint Injection/Arthrocentesis  Date/Time: 11/16/2018 2:10 PM  Performed by: NIEVES SARGENT  Authorized by: NIEVES SARGENT     Indications:  Pain  Needle Size:  25 G  Guidance: ultrasound    Approach:  Lateral  Location:  Elbow  Site:  R elbow  Medications:  6 mg betamethasone acet & sod phos 6 (3-3) MG/ML; 2 mL ropivacaine 5 MG/ML  Outcome:  Tolerated well, no immediate complications  Procedure discussed: discussed risks, benefits, and alternatives    Consent Given by:  Patient  Timeout: " timeout called immediately prior to procedure    Prep: patient was prepped and draped in usual sterile fashion     Patient reported significant improvement of pain after injection.  Aftercare instructions given to patient.  Plan to follow-up as discussed above.    Amadou Oneill                Patient's conditions were thoroughly discussed during today's visit with greater than 50% of the visit spent counseling the patient with total time spent face-to-face with the patient being 25 minutes.    Amadou Oneill MD, [facility]      Again, thank you for allowing me to participate in the care of your patient.        Sincerely,        Amadou Oneill MD

## 2018-11-16 NOTE — MR AVS SNAPSHOT
After Visit Summary   11/16/2018    Scott Fernandez    MRN: [id number]           Patient Information     Date Of Birth          1970        Visit Information        Provider Department      11/16/2018 2:00 PM Amadou Oneill MD [facility]        Today's Diagnoses     Right lateral epicondylitis    -  1      Care Instructions    Patient to follow up with Primary Care provider regarding elevated blood pressure.    1. Wear carpal tunnel brace  2. Continue to do exercises  3. Limit painful activities  4. Follow-up if your symptoms do not improve or worsen    Thank you for coming in today!    Amadou Oneill            Follow-ups after your visit        Who to contact     If you have questions or need follow up information about today's clinic visit or your schedule please contact [facility] directly at [phone].  Normal or non-critical lab and imaging results will be communicated to you by Biogazellehart, letter or phone within 4 business days after the clinic has received the results. If you do not hear from us within 7 days, please contact the clinic through Biogazellehart or phone. If you have a critical or abnormal lab result, we will notify you by phone as soon as possible.  Submit refill requests through Vouchr or call your pharmacy and they will forward the refill request to us. Please allow 3 business days for your refill to be completed.          Additional Information About Your Visit        MyChart Information     Vouchr gives you secure access to your electronic health record. If you see a primary care provider, you can also send messages to your care team and make appointments. If you have questions, please call your primary care clinic.  If you do not have a primary care provider, please call [phone] and they will assist you.        Care EveryWhere ID     This is your Care EveryWhere ID. This could be used by  "other organizations to access your [place] medical records  [id number]        Your Vitals Were     Height BMI (Body Mass Index)                5' 11\" (1.803 m) 35.43 kg/m2           Blood Pressure from Last 3 Encounters:   11/16/18 (!) 147/107   10/22/18 (!) 139/93   10/12/18 138/88    Weight from Last 3 Encounters:   11/16/18 254 lb (115.2 kg)   10/22/18 254 lb (115.2 kg)   10/12/18 254 lb (115.2 kg)              Today, you had the following     No orders found for display       Primary Care Provider Office Phone # Fax #    Olga Lidia Roldan, [phone]       [address] [facility] [address]        Equal Access to Services     GITA VELAZCO : Hadii aad ku hadasho Soomaali, waaxda luqadaha, qaybta kaalmada adeegyada, ana paula armijo . So [facility] [phone].    ATENCIÓN: Si habla español, tiene a jaimes disposición servicios gratuitos de asistencia lingüística. Llame al [phone].    We comply with applicable federal civil rights laws and [place] laws. We do not discriminate on the basis of race, color, national origin, age, disability, sex, sexual orientation, or gender identity.            Thank you!     Thank you for choosing [facility]  for your care. Our goal is always to provide you with excellent care. Hearing back from our patients is one way we can continue to improve our services. Please take a few minutes to complete the written survey that you may receive in the mail after your visit with us. Thank you!             Your Updated Medication List - Protect others around you: Learn how to safely use, store and throw away your medicines at www.disposemymeds.org.          This list is accurate as of 11/16/18  2:22 PM.  Always use your most recent med list.                   Brand Name Dispense Instructions for use Diagnosis    ALLERGY MED PO           cetirizine 10 MG tablet    zyrTEC    30 tablet    Take 1 tablet (10 mg) by mouth " every evening as needed for allergies    Seasonal allergic rhinitis, unspecified chronicity, unspecified trigger       fluticasone 50 MCG/ACT spray    FLONASE    1 Bottle    Spray 1-2 sprays into both nostrils daily    Seasonal allergic rhinitis, unspecified chronicity, unspecified trigger       naproxen 500 MG tablet    NAPROSYN    20 tablet    Take 1 tablet (500 mg) by mouth 2 times daily as needed for moderate pain    Numbness of right hand, Lateral epicondylitis of right elbow       simvastatin 20 MG tablet    ZOCOR    90 tablet    Take 1 tablet (20 mg) by mouth At Bedtime    Hyperlipidemia LDL goal <130

## 2018-11-16 NOTE — PATIENT INSTRUCTIONS
Patient to follow up with Primary Care provider regarding elevated blood pressure.    1. Wear carpal tunnel brace  2. Continue to do exercises  3. Limit painful activities  4. Follow-up if your symptoms do not improve or worsen    Thank you for coming in today!    Amadou Oneill

## 2018-11-16 NOTE — PROGRESS NOTES
ASSESSMENT & PLAN  Scott was seen today for follow up for.    Diagnoses and all orders for this visit:    Right lateral epicondylitis    Other orders  -     Arthrocentesis    48-year-old male with past medical history of carpal tunnel syndrome status post release resenting for follow-up evaluation on right lateral elbow pain.  History and exam findings as well as patient's occupation as an emergency dispatcher in line with lateral epicondylitis not improved with conservative management including NSAIDs, strap, stretching/exercises.  Given the amount of pain the patient is currently in a one-time steroid injection was completed with patient reporting significant improvement afterwards.  He is continue home exercise regimen as previously prescribed.  We will follow-up as needed.  If symptoms do not improve can consider PRP versus percutaneous needle tenotomy.  Patient understands and agrees to plan.      -----    SUBJECTIVE  Scott Fernandez is a/an 48 year old Right handed male who is seen in consultation at the request of  Olga Lidia Roldan N.P. for evaluation of right elbow pain. The patient is seen with their wife, Nely.  Pt was given exercises by PCP but did not try any of them.  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  He had a significant amount of pain with raking leaves, no big projects planned.    Onset: 2 month(s) ago. Reports insidious onset without acute precipitating event.  Location of Pain: right elbow numbness, tingling, and weakness of hand   Rating of Pain at worst: 7/10  Rating of Pain Currently: 5/10  Worsened by: overuse and gripping   Better with: rest   Treatments tried: tennis elbow strap, compression sleeve, Ibuprofen, Tylenol, heat and ice  Associated symptoms: numbness and tingling  Orthopedic history: NO  Relevant surgical history: None on elbow. RCR on right shoulder.     Interim History - November 16, 2018  Since last visit on  "11/12/2018 patient has unchanged right elbow pain.  States that he has tried to limit his activity.   No interim injury.   Patient has tried to do home exercise regimen, I feel stretching does not improve his symptoms.  He has been trying to do some of the lifting exercises as well.  Symptoms affect his day-to-day activities as well as ability to sleep at night as well.  Still localized over the lateral elbow mild radiation towards the mid forearm.  No numbness or tingling in his hands.    Past Medical History:   Diagnosis Date     Esophageal reflux     GERD     High cholesterol      HTN (hypertension)      PONV (postoperative nausea and vomiting)      Social History     Social History     Marital status:      Spouse name: N/A     Number of children: N/A     Years of education: N/A     Social History Main Topics     Smoking status: Never Smoker     Smokeless tobacco: Never Used     Alcohol use No     Drug use: No     Sexual activity: Yes     Partners: Female     Other Topics Concern      Service No     Blood Transfusions No     Caffeine Concern No     Occupational Exposure No     Hobby Hazards No     Sleep Concern No     Stress Concern No     Weight Concern No     Special Diet No     Back Care No     Exercise No     Seat Belt Yes     Self-Exams Yes     Parent/Sibling W/ Cabg, Mi Or Angioplasty Before 65f 55m? Yes     Social History Narrative       Patient's past medical, surgical, social, and family histories were reviewed today and no changes are noted.    REVIEW OF SYSTEMS:  10 point ROS is negative other than symptoms noted above in HPI, Past Medical History or as stated below  Constitutional: NEGATIVE for fever, chills, change in weight  Skin: NEGATIVE for worrisome rashes, moles or lesions  GI/: NEGATIVE for bowel or bladder changes  Neuro: NEGATIVE for weakness, dizziness or paresthesias    OBJECTIVE:  BP (!) 147/107  Ht 5' 11\" (1.803 m)  Wt 254 lb (115.2 kg)  BMI 35.43 kg/m2   General: " healthy, alert and in no distress  HEENT: no scleral icterus or conjunctival erythema  Skin: no suspicious lesions or rash. No jaundice.  CV: regular rhythm by palpation  Resp: normal respiratory effort without conversational dyspnea   Psych: normal mood and affect  Gait: normal steady gait with appropriate coordination and balance  Neuro: Normal sensory exam of bilateral hands.   MSK:  LEFT ELBOW  Inspection:    No swelling, bruising, discoloration, or obvious deformity or asymmetry  Palpation:    Tender about the lateral epicondyle. Remainder of bony, ligamentous and tendinous landmarks are nontender.    Crepitus is Absent  Range of Motion:     Extension full / flexion full / pronation full / supination full  Strength:    No deficits in flexion, extension, pronation, or supination.  Special Tests:    Positive: Pain with resisted wrist extension    Negative: pain with resisted wrist flexion, pain with resisted supination, passive valgus stress, pain with resisted pronation, varus stress, cubital tunnel (ulnar Tinel's)    Independent visualization of the below image:  No results found for this or any previous visit (from the past 24 hour(s)).    Medium Joint Injection/Arthrocentesis  Date/Time: 11/16/2018 2:10 PM  Performed by: AMADOU SARGENT  Authorized by: AMADOU SARGENT     Indications:  Pain  Needle Size:  25 G  Guidance: ultrasound    Approach:  Lateral  Location:  Elbow  Site:  R elbow  Medications:  6 mg betamethasone acet & sod phos 6 (3-3) MG/ML; 2 mL ropivacaine 5 MG/ML  Outcome:  Tolerated well, no immediate complications  Procedure discussed: discussed risks, benefits, and alternatives    Consent Given by:  Patient  Timeout: timeout called immediately prior to procedure    Prep: patient was prepped and draped in usual sterile fashion     Patient reported significant improvement of pain after injection.  Aftercare instructions given to patient.  Plan to follow-up as discussed above.    Amadou ISRAEL  Mai                Patient's conditions were thoroughly discussed during today's visit with greater than 50% of the visit spent counseling the patient with total time spent face-to-face with the patient being 25 minutes.    Amadou Oneill MD, [facility]

## 2018-11-19 RX ORDER — BETAMETHASONE SODIUM PHOSPHATE AND BETAMETHASONE ACETATE 3; 3 MG/ML; MG/ML
6 INJECTION, SUSPENSION INTRA-ARTICULAR; INTRALESIONAL; INTRAMUSCULAR; SOFT TISSUE
Status: DISCONTINUED | OUTPATIENT
Start: 2018-11-16 | End: 2019-09-26

## 2018-11-19 RX ORDER — ROPIVACAINE HYDROCHLORIDE 5 MG/ML
2 INJECTION, SOLUTION EPIDURAL; INFILTRATION; PERINEURAL
Status: DISCONTINUED | OUTPATIENT
Start: 2018-11-16 | End: 2019-09-26

## 2019-01-28 NOTE — TELEPHONE ENCOUNTER
Left message for patient at      Telephone Information:   Mobile [phone]    to schedule procedure.  Patient to return call to Open Access Scheduling Patient Line [phone].     Pt informed/ advised as noted per Carina.  Pt agreeable, will monitor characteristics of cough, timing of cough and contact provider, clinic nurse if cough persists, worsens.  KAL Koenig RN

## 2019-04-24 ENCOUNTER — TELEPHONE (OUTPATIENT)
Dept: FAMILY MEDICINE | Facility: CLINIC | Age: 49
End: 2019-04-24

## 2019-04-24 NOTE — TELEPHONE ENCOUNTER
Panel Management Review      Patient has the following on his problem list:     Hypertension   Last three blood pressure readings:  BP Readings from Last 3 Encounters:   11/16/18 (!) 147/107   10/22/18 (!) 139/93   10/12/18 138/88     Blood pressure: FAILED    HTN Guidelines:  Less than 140/90      Composite cancer screening  Chart review shows that this patient is due/due soon for the following None  Summary:    Patient is due/failing the following:   BP CHECK    Action needed:   Patient needs office visit for BP check.    Type of outreach:    Sent letter.    Questions for provider review:    None                                                                                                                                    MM     Chart routed to Care Team .

## 2019-04-24 NOTE — LETTER
[facility]   [address] [place] [address]  [phone]        Scott Fernandez                                                               Date: 4/24/2019  [address] [facility] [address]      Dear Scott,    In order to ensure we are providing the best quality care, we have reviewed your chart and see that you are due for:  1.   Blood Pressure Check - last office visits your blood pressure has been elevated.  Please call us if you take your blood pressure at home and they are running good.  If not, we would like to see you in office for a recheck  2.   Colonoscopy (when you turn 49)  Please call the clinic at your earliest convenience to schedule an appointment.    We greatly appreciate the opportunity to serve you.  Thank you for trusting us with your health care.      Sincerely,    Your care team at [facility]    Beti Roldan, CNP

## 2019-08-12 ENCOUNTER — HOSPITAL ENCOUNTER (OUTPATIENT)
Facility: CLINIC | Age: 49
End: 2019-08-12
Attending: SURGERY | Admitting: SURGERY
Payer: COMMERCIAL

## 2019-08-12 ENCOUNTER — TELEPHONE (OUTPATIENT)
Dept: FAMILY MEDICINE | Facility: CLINIC | Age: 49
End: 2019-08-12

## 2019-08-12 DIAGNOSIS — Z12.11 SPECIAL SCREENING FOR MALIGNANT NEOPLASMS, COLON: Primary | ICD-10-CM

## 2019-09-26 ENCOUNTER — ANESTHESIA EVENT (OUTPATIENT)
Dept: UROLOGY | Facility: CLINIC | Age: 49
End: 2019-09-26

## 2019-09-26 NOTE — ANESTHESIA PREPROCEDURE EVALUATION
Anesthesia Pre-Procedure Evaluation    Patient: Scott Fernandez   MRN: [id number] : 1970          Preoperative Diagnosis: screening    Procedure(s):  COLONOSCOPY    Past Medical History:   Diagnosis Date     Esophageal reflux     GERD     High cholesterol      HTN (hypertension)      PONV (postoperative nausea and vomiting)      Past Surgical History:   Procedure Laterality Date     APPENDECTOMY   approx     ARTHROSCOPY SHOULDER, OPEN ROTATOR CUFF REPAIR, COMBINED  2013    Procedure: COMBINED ARTHROSCOPY SHOULDER, OPEN ROTATOR CUFF REPAIR;  Right Shoulder Arthroscopic Mini-Open Rotator Cuff Repair;  Surgeon: Ley, Jeffrey Duane, MD;  Location: [facility] OR     LAPAROSCOPIC CHOLECYSTECTOMY N/A 2016    Procedure: LAPAROSCOPIC CHOLECYSTECTOMY;  Surgeon: Rge Giang MD;  Location: [facility] OR     ORTHOPEDIC SURGERY      Left Shoulder     SURGICAL HISTORY OF -       shoulder left RCR     SURGICAL HISTORY OF -       Carpal tunnel procedure 2 on rt wrist and 1 on left wrist     SURGICAL HISTORY OF -       shoulder left       Anesthesia Evaluation     . Pt has had prior anesthetic. Type: General    History of anesthetic complications   - PONV        ROS/MED HX    ENT/Pulmonary:       Neurologic:       Cardiovascular:     (+) Dyslipidemia, hypertension----. : . . . :. .       METS/Exercise Tolerance:     Hematologic:         Musculoskeletal:         GI/Hepatic:     (+) GERD       Renal/Genitourinary:         Endo:     (+) Obesity, .      Psychiatric:         Infectious Disease:         Malignancy:         Other:                                 Lab Results   Component Value Date    WBC 9.3 2016    HGB 16.2 2016    HCT 47.7 2016     2016     01/10/2017    POTASSIUM 3.8 01/10/2017    CHLORIDE 101 01/10/2017    CO2 26 01/10/2017    BUN 16 01/10/2017    CR 0.99 01/10/2017     (H) 01/10/2017    GEETA 9.0 01/10/2017    MAG 2.0 2004     "ALBUMIN 4.0 07/26/2016    PROTTOTAL 7.7 07/26/2016    ALT 37 07/26/2016    AST 20 07/26/2016     (A) 02/28/2008    ALKPHOS 71 07/26/2016    BILITOTAL 0.8 07/26/2016    TSH 2.27 03/25/2016       Preop Vitals  BP Readings from Last 3 Encounters:   11/16/18 (!) 147/107   10/22/18 (!) 139/93   10/12/18 138/88    Pulse Readings from Last 3 Encounters:   10/12/18 83   05/23/18 88   05/30/17 79      Resp Readings from Last 3 Encounters:   10/12/18 16   05/23/18 16   05/30/17 18    SpO2 Readings from Last 3 Encounters:   10/12/18 99%   05/23/18 98%   05/30/17 98%      Temp Readings from Last 1 Encounters:   10/12/18 36.8  C (98.3  F) (Tympanic)    Ht Readings from Last 1 Encounters:   11/16/18 1.803 m (5' 11\")      Wt Readings from Last 1 Encounters:   11/16/18 115.2 kg (254 lb)    Estimated body mass index is 35.43 kg/m  as calculated from the following:    Height as of 11/16/18: 1.803 m (5' 11\").    Weight as of 11/16/18: 115.2 kg (254 lb).                   NIDHI Gilliland CRNA  "

## 2019-09-30 ENCOUNTER — ANESTHESIA (OUTPATIENT)
Dept: UROLOGY | Facility: CLINIC | Age: 49
End: 2019-09-30

## 2020-02-10 ENCOUNTER — HEALTH MAINTENANCE LETTER (OUTPATIENT)
Age: 50
End: 2020-02-10

## 2020-11-14 ENCOUNTER — HEALTH MAINTENANCE LETTER (OUTPATIENT)
Age: 50
End: 2020-11-14

## 2021-03-28 ENCOUNTER — HEALTH MAINTENANCE LETTER (OUTPATIENT)
Age: 51
End: 2021-03-28

## 2021-09-12 ENCOUNTER — HEALTH MAINTENANCE LETTER (OUTPATIENT)
Age: 51
End: 2021-09-12

## 2022-04-24 ENCOUNTER — HEALTH MAINTENANCE LETTER (OUTPATIENT)
Age: 52
End: 2022-04-24

## 2022-11-19 ENCOUNTER — HEALTH MAINTENANCE LETTER (OUTPATIENT)
Age: 52
End: 2022-11-19

## 2023-03-03 ENCOUNTER — OFFICE VISIT (OUTPATIENT)
Dept: FAMILY MEDICINE | Facility: CLINIC | Age: 53
End: 2023-03-03
Payer: COMMERCIAL

## 2023-03-03 VITALS
DIASTOLIC BLOOD PRESSURE: 89 MMHG | HEIGHT: 71 IN | RESPIRATION RATE: 16 BRPM | TEMPERATURE: 97.3 F | BODY MASS INDEX: 34.44 KG/M2 | WEIGHT: 246 LBS | HEART RATE: 88 BPM | SYSTOLIC BLOOD PRESSURE: 139 MMHG | OXYGEN SATURATION: 99 %

## 2023-03-03 DIAGNOSIS — L72.3 SEBACEOUS CYST: Primary | ICD-10-CM

## 2023-03-03 PROCEDURE — 99213 OFFICE O/P EST LOW 20 MIN: CPT | Performed by: PHYSICIAN ASSISTANT

## 2023-03-03 ASSESSMENT — PAIN SCALES - GENERAL: PAINLEVEL: NO PAIN (0)

## 2023-03-03 NOTE — PROGRESS NOTES
"  Assessment & Plan       ICD-10-CM    1. Sebaceous cyst  L72.3 Adult General Surg Referral      Talk to patient about his concerns I suspect this is a sebaceous cyst but due to the location close to his spine I will refer him to general surgery for second opinion.  Warning signs were discussed    Return in about 2 weeks (around 3/17/2023) for recheck, As Needed.    GRETA Bowser [facility]    Mercy Chavez is a 52 year old accompanied by his self, presenting for the following health issues:  Mass      History of Present Illness       Reason for visit:  Bump on back  Symptom onset:  More than a month  Symptoms include:  Pimple like  Symptom progression:  Staying the same  Had these symptoms before:  No    He eats 2-3 servings of fruits and vegetables daily.He consumes 3 sweetened beverage(s) daily.He exercises with enough effort to increase his heart rate 30 to 60 minutes per day.  He exercises with enough effort to increase his heart rate 4 days per week.   He is taking medications regularly.   lump as need there for months but more prominent in the last 4-6 wks.   States his wife will squeeze it and get him whitish material out of it.  No pain or fevers or redness.  Otherwise it does not bother him but he would like removed    Review of Systems   Constitutional, HEENT, cardiovascular, pulmonary, gi and gu systems are negative, except as otherwise noted.      Objective    /89   Pulse 88   Temp 97.3  F (36.3  C) (Tympanic)   Resp 16   Ht 1.803 m (5' 11\")   Wt 111.6 kg (246 lb)   SpO2 99%   BMI 34.31 kg/m    Body mass index is 34.31 kg/m .  Physical Exam   GENERAL: healthy, alert and no distress  Examination of his back he has at least a 2 cm cystic type lesion just to the left of his lower thoracic spinous processes.                  "
28-Dec-2019 16:27

## 2023-03-29 ENCOUNTER — OFFICE VISIT (OUTPATIENT)
Dept: SURGERY | Facility: CLINIC | Age: 53
End: 2023-03-29
Attending: PHYSICIAN ASSISTANT
Payer: COMMERCIAL

## 2023-03-29 VITALS
WEIGHT: 248 LBS | SYSTOLIC BLOOD PRESSURE: 141 MMHG | HEART RATE: 66 BPM | BODY MASS INDEX: 34.59 KG/M2 | DIASTOLIC BLOOD PRESSURE: 88 MMHG

## 2023-03-29 DIAGNOSIS — L72.3 SEBACEOUS CYST: ICD-10-CM

## 2023-03-29 PROCEDURE — 99203 OFFICE O/P NEW LOW 30 MIN: CPT | Performed by: SURGERY

## 2023-03-29 NOTE — PROGRESS NOTES
Patient seen in consultation for cyst on back by Fernando Mcfarlane    HPI:  Patient is a 52 year old male  with complaints of cyst on his back  The patient noticed the symptoms about long time ago.    Can get stuff out if squeezed but currently not tender or draining, no infection he is aware of  Can get itchy at times. Does not seem to be growing.  nothing makes the episode better.  Patient has not family history of similar problems    Review Of Systems    Skin: as above  Ears/Nose/Throat: negative  Respiratory: No shortness of breath, dyspnea on exertion, cough, or hemoptysis  Cardiovascular: negative  Gastrointestinal: negative  Genitourinary: negative  Musculoskeletal: negative  Neurologic: negative  Hematologic/Lymphatic/Immunologic: negative  Endocrine: negative      Past Medical History:   Diagnosis Date     Esophageal reflux     GERD     High cholesterol      HTN (hypertension)      PONV (postoperative nausea and vomiting)        Past Surgical History:   Procedure Laterality Date     APPENDECTOMY  1990 approx     ARTHROSCOPY SHOULDER, OPEN ROTATOR CUFF REPAIR, COMBINED  7/22/2013    Procedure: COMBINED ARTHROSCOPY SHOULDER, OPEN ROTATOR CUFF REPAIR;  Right Shoulder Arthroscopic Mini-Open Rotator Cuff Repair;  Surgeon: Ley, Jeffrey Duane, MD;  Location: [facility] OR     LAPAROSCOPIC CHOLECYSTECTOMY N/A 11/16/2016    Procedure: LAPAROSCOPIC CHOLECYSTECTOMY;  Surgeon: Reg Giang MD;  Location: [facility] OR     ORTHOPEDIC SURGERY  2009    Left Shoulder     SURGICAL HISTORY OF -   1996    shoulder left RCR     SURGICAL HISTORY OF -   1980's    Carpal tunnel procedure 2 on rt wrist and 1 on left wrist     SURGICAL HISTORY OF -   2008    shoulder left       Social History     Socioeconomic History     Marital status:      Spouse name: Not on file     Number of children: Not on file     Years of education: Not on file     Highest education level: Not on file   Occupational History     Not on file   Tobacco  Use     Smoking status: Never     Smokeless tobacco: Never   Substance and Sexual Activity     Alcohol use: No     Drug use: No     Sexual activity: Yes     Partners: Female   Other Topics Concern      Service No     Blood Transfusions No     Caffeine Concern No     Occupational Exposure No     Hobby Hazards No     Sleep Concern No     Stress Concern No     Weight Concern No     Special Diet No     Back Care No     Exercise No     Bike Helmet Not Asked     Seat Belt Yes     Self-Exams Yes     Parent/sibling w/ CABG, MI or angioplasty before 65F 55M? Yes   Social History Narrative     Not on file     Social Determinants of Health     Financial Resource Strain: Not on file   Food Insecurity: Not on file   Transportation Needs: Not on file   Physical Activity: Not on file   Stress: Not on file   Social Connections: Not on file   Intimate Partner Violence: Not on file   Housing Stability: Not on file       No current outpatient medications on file.       Medications and history reviewed    Physical exam:  Vitals: BP (!) 141/88   Pulse 66   Wt 112.5 kg (248 lb)   BMI 34.59 kg/m    BMI= Body mass index is 34.59 kg/m .    Constitutional: healthy, alert and no distress  Head: Normocephalic. No masses, lesions, tenderness or abnormalities  Skin: about 1.5 cm sebaceous cyst lower back left of midline, no drainage, no   Psychiatric: mentation appears normal and affect normal/bright      Assessment:     ICD-10-CM    1. Sebaceous cyst  L72.3 Adult General Surg Referral        Plan: Small sebaceous cyst on back, no current infection.  Should be able to remove in clinic under local anesthesia.  Patient agreeable with this plan so we will work on scheduling a time.    Ty Dozier MD

## 2023-03-29 NOTE — LETTER
3/29/2023         RE: Scott Fernandez  [address] [facility] [address]        Dear Colleague,    Thank you for referring your patient, Scott Fernandez, to the [facility]. Please see a copy of my visit note below.    Patient seen in consultation for cyst on back by Fernando Mcfarlane    HPI:  Patient is a 52 year old male  with complaints of cyst on his back  The patient noticed the symptoms about long time ago.    Can get stuff out if squeezed but currently not tender or draining, no infection he is aware of  Can get itchy at times. Does not seem to be growing.  nothing makes the episode better.  Patient has not family history of similar problems    Review Of Systems    Skin: as above  Ears/Nose/Throat: negative  Respiratory: No shortness of breath, dyspnea on exertion, cough, or hemoptysis  Cardiovascular: negative  Gastrointestinal: negative  Genitourinary: negative  Musculoskeletal: negative  Neurologic: negative  Hematologic/Lymphatic/Immunologic: negative  Endocrine: negative      Past Medical History:   Diagnosis Date     Esophageal reflux     GERD     High cholesterol      HTN (hypertension)      PONV (postoperative nausea and vomiting)        Past Surgical History:   Procedure Laterality Date     APPENDECTOMY  1990 approx     ARTHROSCOPY SHOULDER, OPEN ROTATOR CUFF REPAIR, COMBINED  7/22/2013    Procedure: COMBINED ARTHROSCOPY SHOULDER, OPEN ROTATOR CUFF REPAIR;  Right Shoulder Arthroscopic Mini-Open Rotator Cuff Repair;  Surgeon: Ley, Jeffrey Duane, MD;  Location: [facility] OR     LAPAROSCOPIC CHOLECYSTECTOMY N/A 11/16/2016    Procedure: LAPAROSCOPIC CHOLECYSTECTOMY;  Surgeon: Reg Giang MD;  Location: [facility] OR     ORTHOPEDIC SURGERY  2009    Left Shoulder     SURGICAL HISTORY OF -   1996    shoulder left RCR     SURGICAL HISTORY OF -   1980's    Carpal tunnel procedure 2 on rt wrist and 1 on left wrist     SURGICAL HISTORY OF -   2008    shoulder  left       Social History     Socioeconomic History     Marital status:      Spouse name: Not on file     Number of children: Not on file     Years of education: Not on file     Highest education level: Not on file   Occupational History     Not on file   Tobacco Use     Smoking status: Never     Smokeless tobacco: Never   Substance and Sexual Activity     Alcohol use: No     Drug use: No     Sexual activity: Yes     Partners: Female   Other Topics Concern      Service No     Blood Transfusions No     Caffeine Concern No     Occupational Exposure No     Hobby Hazards No     Sleep Concern No     Stress Concern No     Weight Concern No     Special Diet No     Back Care No     Exercise No     Bike Helmet Not Asked     Seat Belt Yes     Self-Exams Yes     Parent/sibling w/ CABG, MI or angioplasty before 65F 55M? Yes   Social History Narrative     Not on file     Social Determinants of Health     Financial Resource Strain: Not on file   Food Insecurity: Not on file   Transportation Needs: Not on file   Physical Activity: Not on file   Stress: Not on file   Social Connections: Not on file   Intimate Partner Violence: Not on file   Housing Stability: Not on file       No current outpatient medications on file.       Medications and history reviewed    Physical exam:  Vitals: BP (!) 141/88   Pulse 66   Wt 112.5 kg (248 lb)   BMI 34.59 kg/m    BMI= Body mass index is 34.59 kg/m .    Constitutional: healthy, alert and no distress  Head: Normocephalic. No masses, lesions, tenderness or abnormalities  Skin: about 1.5 cm sebaceous cyst lower back left of midline, no drainage, no   Psychiatric: mentation appears normal and affect normal/bright      Assessment:     ICD-10-CM    1. Sebaceous cyst  L72.3 Adult General Surg Referral        Plan: Small sebaceous cyst on back, no current infection.  Should be able to remove in clinic under local anesthesia.  Patient agreeable with this plan so we will work on  scheduling a time.    Ty Dozier MD        Again, thank you for allowing me to participate in the care of your patient.        Sincerely,        Ty Dozier MD

## 2023-05-08 ENCOUNTER — OFFICE VISIT (OUTPATIENT)
Dept: SURGERY | Facility: CLINIC | Age: 53
End: 2023-05-08
Payer: COMMERCIAL

## 2023-05-08 VITALS — OXYGEN SATURATION: 98 % | BODY MASS INDEX: 34.59 KG/M2 | WEIGHT: 248 LBS | HEART RATE: 72 BPM

## 2023-05-08 DIAGNOSIS — L72.0 RUPTURED SEBACEOUS CYST: Primary | ICD-10-CM

## 2023-05-08 PROCEDURE — 12031 INTMD RPR S/A/T/EXT 2.5 CM/<: CPT | Performed by: SURGERY

## 2023-05-08 PROCEDURE — 11403 EXC TR-EXT B9+MARG 2.1-3CM: CPT | Mod: 51 | Performed by: SURGERY

## 2023-05-08 PROCEDURE — 88304 TISSUE EXAM BY PATHOLOGIST: CPT | Performed by: PATHOLOGY

## 2023-05-08 NOTE — LETTER
5/8/2023         RE: Scott Fernandez  [address] [facility] [address]        Dear Colleague,    Thank you for referring your patient, Scott Fernandez, to the [facility]. Please see a copy of my visit note below.    PROCEDURE:   Written consent was obtained  The left mid back area was prepped and appropriately anesthetized with 1% lidocaine with epinephrine. Using the usual technique, 4 cm full thickness elliptical excision was performed. This appeared as ruptured sebaceous cyst and removed in total. The total area excised, including lesion and margins was 2.5 cm.  Closure was accomplished with interrupted 3-0 vicryl for the deep subcutaneous layer and running subcuticular 4-0 vicryl for the skin.  Incision was covered with dermabond..  The procedure was well tolerated and without complications. Specimen was sent to Pathology.    Ty Dozier MD        Again, thank you for allowing me to participate in the care of your patient.        Sincerely,        Ty Dozier MD

## 2023-05-08 NOTE — PROGRESS NOTES
PROCEDURE:   Written consent was obtained  The left mid back area was prepped and appropriately anesthetized with 1% lidocaine with epinephrine. Using the usual technique, 4 cm full thickness elliptical excision was performed. This appeared as ruptured sebaceous cyst and removed in total. The total area excised, including lesion and margins was 2.5 cm.  Closure was accomplished with interrupted 3-0 vicryl for the deep subcutaneous layer and running subcuticular 4-0 vicryl for the skin.  Incision was covered with dermabond..  The procedure was well tolerated and without complications. Specimen was sent to Pathology.    Ty Dozier MD

## 2023-05-10 LAB
PATH REPORT.COMMENTS IMP SPEC: NORMAL
PATH REPORT.COMMENTS IMP SPEC: NORMAL
PATH REPORT.FINAL DX SPEC: NORMAL
PATH REPORT.GROSS SPEC: NORMAL
PATH REPORT.MICROSCOPIC SPEC OTHER STN: NORMAL
PATH REPORT.RELEVANT HX SPEC: NORMAL
PHOTO IMAGE: NORMAL

## 2023-06-01 ENCOUNTER — HEALTH MAINTENANCE LETTER (OUTPATIENT)
Age: 53
End: 2023-06-01

## 2023-08-16 ENCOUNTER — LAB (OUTPATIENT)
Dept: FAMILY MEDICINE | Facility: CLINIC | Age: 53
End: 2023-08-16

## 2023-08-16 ENCOUNTER — OFFICE VISIT (OUTPATIENT)
Dept: FAMILY MEDICINE | Facility: CLINIC | Age: 53
End: 2023-08-16
Payer: COMMERCIAL

## 2023-08-16 ENCOUNTER — ANCILLARY PROCEDURE (OUTPATIENT)
Dept: GENERAL RADIOLOGY | Facility: CLINIC | Age: 53
End: 2023-08-16
Attending: PHYSICIAN ASSISTANT
Payer: COMMERCIAL

## 2023-08-16 VITALS
HEIGHT: 71 IN | SYSTOLIC BLOOD PRESSURE: 136 MMHG | RESPIRATION RATE: 18 BRPM | BODY MASS INDEX: 33.6 KG/M2 | HEART RATE: 84 BPM | TEMPERATURE: 97.1 F | OXYGEN SATURATION: 99 % | DIASTOLIC BLOOD PRESSURE: 78 MMHG | WEIGHT: 240 LBS

## 2023-08-16 DIAGNOSIS — M25.531 RIGHT WRIST PAIN: Primary | ICD-10-CM

## 2023-08-16 DIAGNOSIS — Z12.11 SCREEN FOR COLON CANCER: ICD-10-CM

## 2023-08-16 DIAGNOSIS — M25.531 RIGHT WRIST PAIN: ICD-10-CM

## 2023-08-16 PROCEDURE — 73110 X-RAY EXAM OF WRIST: CPT | Mod: TC | Performed by: RADIOLOGY

## 2023-08-16 PROCEDURE — 99213 OFFICE O/P EST LOW 20 MIN: CPT | Mod: 25 | Performed by: PHYSICIAN ASSISTANT

## 2023-08-16 PROCEDURE — 90715 TDAP VACCINE 7 YRS/> IM: CPT | Performed by: PHYSICIAN ASSISTANT

## 2023-08-16 PROCEDURE — 90471 IMMUNIZATION ADMIN: CPT | Performed by: PHYSICIAN ASSISTANT

## 2023-08-16 ASSESSMENT — ANXIETY QUESTIONNAIRES
7. FEELING AFRAID AS IF SOMETHING AWFUL MIGHT HAPPEN: NOT AT ALL
IF YOU CHECKED OFF ANY PROBLEMS ON THIS QUESTIONNAIRE, HOW DIFFICULT HAVE THESE PROBLEMS MADE IT FOR YOU TO DO YOUR WORK, TAKE CARE OF THINGS AT HOME, OR GET ALONG WITH OTHER PEOPLE: NOT DIFFICULT AT ALL
2. NOT BEING ABLE TO STOP OR CONTROL WORRYING: NOT AT ALL
3. WORRYING TOO MUCH ABOUT DIFFERENT THINGS: NOT AT ALL
GAD7 TOTAL SCORE: 0
1. FEELING NERVOUS, ANXIOUS, OR ON EDGE: NOT AT ALL
6. BECOMING EASILY ANNOYED OR IRRITABLE: NOT AT ALL
5. BEING SO RESTLESS THAT IT IS HARD TO SIT STILL: NOT AT ALL
GAD7 TOTAL SCORE: 0

## 2023-08-16 ASSESSMENT — PAIN SCALES - GENERAL: PAINLEVEL: SEVERE PAIN (7)

## 2023-08-16 ASSESSMENT — PATIENT HEALTH QUESTIONNAIRE - PHQ9
SUM OF ALL RESPONSES TO PHQ QUESTIONS 1-9: 0
5. POOR APPETITE OR OVEREATING: NOT AT ALL

## 2023-08-16 NOTE — PATIENT INSTRUCTIONS
Lisa Chavez,    Thank you for allowing [facility] to manage your care.    This is likely recurrent carpal tunnel vs arthritis.    If you develop worsening/changing symptoms at any time, please call 911 or go to the emergency department for evaluation as we discussed.    I ordered some xrays. Please go to our radiology department to get your xrays.    I made a referral to oropedics. They will be calling in approximately 1 week to set up your appointment.  If you do not hear from them, please call the specialty number on your after visit summary.     Please allow 1-2 business days for our office to contact you in regards to your laboratory/radiological studies.  If not done so, I encourage you to login into [facility] ([email or web address] to review your results as well.     For your pain, please use Tylenol 650mg every 6 hours. You may use 400mg of ibuprofen between doses of Tylenol.     Max acetaminophen (Tylenol) 4,000mg/24 hours  Max ibuprofen 3,000mg/24 hours    If you have any questions or concerns, please feel free to call us at [phone]    Sincerely,    Cameron Simon PA-C    Did you know?      You can schedule a video visit for follow-up appointments as well as future appointments for certain conditions.  Please see the below link.     [email or web address]    If you have not already done so,  I encourage you to sign up for [employer] ([email or web address]).  This will allow you to review your results, securely communicate with a provider, and schedule virtual visits as well.

## 2023-08-16 NOTE — PROGRESS NOTES
"  Assessment & Plan   Problem List Items Addressed This Visit    None  Visit Diagnoses       Right wrist pain    -  Primary    Relevant Orders    Orthopedic  Referral    XR Wrist Right G/E 3 Views (Completed)    Screen for colon cancer        Relevant Orders    JESS(EXACT SCIENCES)            Scott Fernandez is a 53 year old male who is right handed and has a history of carpal tunnel release x 2 on the right now presents c/o worsening right wrist pain. Doubt sprain/strain/fracture/contusion/dislocation given no trauma. XR is negative for fracture, but shows mild widening of scapholunate joint. Impression is either recurrent carpal tunnel syndrome, osteoarthritis or other chronic issue.  Low suspicion for septic arthritis, gout, other infectious or rheumatologic processes.  Neurovascularly intact. Will tx with analgesics otc, RICE/heat, and orthopedic follow up with a hand surgeon. Referral placed.     Complete history and physical exam as below. Afebrile with normal vital signs.    DDx and Dx discussed with and explained to the pt to their satisfaction.  All questions were answered at this time. Pt expressed understanding of and agreement with this dx, tx, and plan. No further workup warranted and standard medication warnings given. I have given the patient a list of pertinent indications for re-evaluation. Will go to the Emergency Department if symptoms worsen or new concerning symptoms arise. Patient left in no apparent distress.     Ordering of each unique test     BMI:   Estimated body mass index is 33.47 kg/m  as calculated from the following:    Height as of this encounter: 1.803 m (5' 11\").    Weight as of this encounter: 108.9 kg (240 lb).     See Patient Instructions    IRLANDA Pagan  [facility] ZENAIDA Chavez is a 53 year old, presenting for the following health issues:  Musculoskeletal Problem (Right wrist )        8/16/2023     6:49 AM "   Additional Questions   Roomed by Cassandra   Accompanied by Self       History of Present Illness       Reason for visit:  WRIST PAIN HURTS WHEN I BEND IT , SOMETIMES SWELLING AND BRUISING JUST MOSTLY HURTS WHEN BENDING IT  Symptom onset:  More than a month  Symptoms include:  WRIST HURTS , HANDS AND FINGERS GO TO SLEEP  Symptom intensity:  Moderate  Symptom progression:  Worsening  Had these symptoms before:  Yes  Has tried/received treatment for these symptoms:  Yes  Previous treatment was successful:  No  What makes it worse:  YES USING IT TOO MUCH  What makes it better:  DONT USE IT OR KEEP IT STRAIGHT AND NOT BEND IT    He eats 2-3 servings of fruits and vegetables daily.He consumes 1 sweetened beverage(s) daily.He exercises with enough effort to increase his heart rate 30 to 60 minutes per day.  He exercises with enough effort to increase his heart rate 4 days per week.   He is taking medications regularly.       Pain History:  When did you first notice your pain? Years prior to 2008   Have you seen this provider for your pain in the past? Yes   Where in your body do you have pain? Right wrist  Are you seeing anyone else for your pain? No, years ago  S/p carpal tunnel release x 2, with last in [place]. First surgery as a teenager and . Worse with movement. Wears a splint  which helps some. No injury. Tingling. Radiates into the mid forearm. Right handed.         8/16/2023     6:51 AM   PEG Score   PEG Total Score 7           10/17/2007     2:15 PM 12/5/2007     9:45 AM 8/16/2023     6:53 AM   PHQ-9 SCORE   PHQ-9 Total Score 10 0    PHQ-9 Total Score   0           8/16/2023     6:53 AM   VIRAJ-7 SCORE   Total Score 0           8/16/2023     6:51 AM   PEG Score   PEG Total Score 7       Chronic Pain Follow Up:    Location of pain: Right wrist   Analgesia/pain control:    - Recent changes:  None    - Overall control: Inadequate pain control    - Current treatments: None, brace  Adherence:     - Do you ever take more  "pain medicine than prescribed? No    - When did you take your last dose of pain medicine?  none   Adverse effects: No   PDMP Review       None          Last CSA Agreement:   CSA -- Patient Level:    CSA: None found at the patient level.       Review of Systems   Constitutional, msk, cardiovascular, pulmonary, neuro systems are negative, except as otherwise noted.      Objective    /78   Pulse 84   Temp 97.1  F (36.2  C) (Temporal)   Resp 18   Ht 1.803 m (5' 11\")   Wt 108.9 kg (240 lb)   SpO2 99%   BMI 33.47 kg/m    Body mass index is 33.47 kg/m .  Physical Exam  Vitals and nursing note reviewed.   Constitutional:       General: He is not in acute distress.     Appearance: Normal appearance. He is not diaphoretic.   HENT:      Head: Normocephalic and atraumatic.      Nose: Nose normal.   Eyes:      Conjunctiva/sclera: Conjunctivae normal.   Pulmonary:      Effort: Pulmonary effort is normal. No respiratory distress.   Musculoskeletal:      Comments: RUE: tenderness along the wrist. Snuffbox and rest of extremity non-tender. Aside from mild eema and tenderness,  No other overlying signs of trauma or infection. Distal CMS intact. Remainder of limb non-tender. Symptoms worse with Phalen but not Tinel.     Skin:     General: Skin is dry.      Coloration: Skin is not jaundiced or pale.   Neurological:      General: No focal deficit present.      Mental Status: He is alert. Mental status is at baseline.   Psychiatric:         Mood and Affect: Mood normal.         Behavior: Behavior normal.          Results for orders placed or performed in visit on 08/16/23   XR Wrist Right G/E 3 Views     Status: None    Narrative    RIGHT WRIST THREE OR MORE VIEWS  8/16/2023 7:47 AM     HISTORY: Right wrist pain.    COMPARISON: None.       Impression    IMPRESSION:  Joint spaces are maintained. Scapholunate interval is  borderline widened. No bony subluxation. There is no evidence of an  acute fracture.     ABHILASH VALDIVIA, " MD         SYSTEM ID:  [id number]

## 2023-08-18 NOTE — TELEPHONE ENCOUNTER
DIAGNOSIS: Right wrist pain   APPOINTMENT DATE: 09/08/2023   NOTES STATUS DETAILS   OFFICE NOTE from referring provider Internal 08/16/2023 -- West Simon PA in [facility]   MEDICATION LIST Internal    Imaging Internal 08/16/2023 - RT WRIST XRAY  12/04/2012 - RT HAND XRAY

## 2023-09-01 ENCOUNTER — OFFICE VISIT (OUTPATIENT)
Dept: ORTHOPEDICS | Facility: CLINIC | Age: 53
End: 2023-09-01
Payer: COMMERCIAL

## 2023-09-01 VITALS
WEIGHT: 240 LBS | SYSTOLIC BLOOD PRESSURE: 151 MMHG | BODY MASS INDEX: 33.47 KG/M2 | HEART RATE: 87 BPM | DIASTOLIC BLOOD PRESSURE: 93 MMHG

## 2023-09-01 DIAGNOSIS — M25.531 PAIN AND SWELLING OF RIGHT WRIST: ICD-10-CM

## 2023-09-01 DIAGNOSIS — G89.29 CHRONIC PAIN OF RIGHT WRIST: Primary | ICD-10-CM

## 2023-09-01 DIAGNOSIS — M25.431 PAIN AND SWELLING OF RIGHT WRIST: ICD-10-CM

## 2023-09-01 DIAGNOSIS — M25.531 CHRONIC PAIN OF RIGHT WRIST: Primary | ICD-10-CM

## 2023-09-01 DIAGNOSIS — M77.8 RIGHT WRIST TENDINITIS: ICD-10-CM

## 2023-09-01 DIAGNOSIS — M67.431 GANGLION CYST OF WRIST, RIGHT: ICD-10-CM

## 2023-09-01 PROCEDURE — 99204 OFFICE O/P NEW MOD 45 MIN: CPT | Performed by: FAMILY MEDICINE

## 2023-09-01 NOTE — PROGRESS NOTES
Scott Fernandez  :  1970  DOS: 2023  MRN: [id number]    Sports Medicine Clinic Visit    PCP: Fernando Mcfarlane PA-C    Scott Fernandez is a 53 year old Right hand dominant male who is seen as a WALK IN patient presenting with right wrist pain.    Injury: Gradual onset of pain over the past 5 years. Recently got worse over the past couple months. Pain located over dorsal wrist pain and radiates into the forearm. Additional Features:  Positive: swelling and weakness.  Symptoms are better with rest, ice, ibuprofen, wrist brace- somewhat helps. Symptoms are worse with: using the hand/wrist.  Other evaluation and/or treatments so far consists of: Ice, Ibuprofen, and Rest.  Recent imaging completed: X-rays completed 23.  Prior History of related problems: R CTR - 10+ years ago.     Social History: currently employed as emergency dispatcher     Review of Systems  Musculoskeletal: as above  Remainder of review of systems is negative including constitutional, CV, pulmonary, GI, Skin and Neurologic except as noted in HPI or medical history.    Past Medical History:   Diagnosis Date    Esophageal reflux     GERD    High cholesterol     HTN (hypertension)     PONV (postoperative nausea and vomiting)      Past Surgical History:   Procedure Laterality Date    APPENDECTOMY   approx    ARTHROSCOPY SHOULDER, OPEN ROTATOR CUFF REPAIR, COMBINED  2013    Procedure: COMBINED ARTHROSCOPY SHOULDER, OPEN ROTATOR CUFF REPAIR;  Right Shoulder Arthroscopic Mini-Open Rotator Cuff Repair;  Surgeon: Ley, Jeffrey Duane, MD;  Location: [facility] OR    LAPAROSCOPIC CHOLECYSTECTOMY N/A 2016    Procedure: LAPAROSCOPIC CHOLECYSTECTOMY;  Surgeon: Reg Giang MD;  Location: [facility] OR    ORTHOPEDIC SURGERY      Left Shoulder    SURGICAL HISTORY OF -       shoulder left RCR    SURGICAL HISTORY OF -       Carpal tunnel procedure 2 on rt wrist and 1 on left wrist    SURGICAL HISTORY OF -        shoulder left     Family History   Problem Relation Age of Onset    Cardiovascular Mother         CHF starting around 50-idiopathic cardiomyopathy    Lipids Father     Cardiovascular Father         Stents, in late 50's    C.A.D. Father     Alcohol/Drug Maternal Grandmother     Alcohol/Drug Maternal Grandfather          Objective  BP (!) 151/93   Pulse 87   Wt 108.9 kg (240 lb)   BMI 33.47 kg/m      General: healthy, alert and in no distress    HEENT: no scleral icterus or conjunctival erythema   Skin: no suspicious lesions or rash. No jaundice.   CV: regular rhythm by palpation, 2+ distal pulses, no pedal edema    Resp: normal respiratory effort without conversational dyspnea   Psych: normal mood and affect    Gait: nonantalgic, appropriate coordination and balance   Neuro: normal light touch sensory exam of the extremities. Motor strength as noted below     Right Wrist and Hand exam    Inspection:       Swelling: broadly over dorsal hand and wrist, no warmth or induration or redness, no drainage    Tender:       Generally over the dorsal wrist      Most focal over dorsal hand, ED tendons and in proximal carpal row near scapholunate junction, also focal over ECU tendon, milder over 1st dorsal compartment and 1st CMC joint and RC joint    Non Tender:       Remainder of the Wrist and Hand right    ROM:       Decreased active and passive ROM of the wrist due to pain    Strength:       5/5 strength in the muscles of the hand, wrist and forearm right    Special Tests:        painful TFCC compression test right and       equivocal Finkelstein's maneuver right    Neurovascular:       2+ radial pulses bilaterally with brisk capillary refill and      normal sensation to light touch in the radial, median and ulnar nerve distributions    Radiology:  Recent Results (from the past 744 hour(s))   XR Wrist Right G/E 3 Views    Narrative    RIGHT WRIST THREE OR MORE VIEWS  8/16/2023 7:47 AM     HISTORY: Right wrist  pain.    COMPARISON: None.       Impression    IMPRESSION:  Joint spaces are maintained. Scapholunate interval is  borderline widened. No bony subluxation. There is no evidence of an  acute fracture.     ABHILASH VALDIVIA MD         SYSTEM ID:  [id number]       Assessment:  1. Chronic pain of right wrist    2. Pain and swelling of right wrist    3. Ganglion cyst of wrist, right    4. Right wrist tendinitis        Plan:  Discussed the assessment with the patient.  Follow up: will contact with MRI results, has upcoming appt next week with hand surgery team  Overall improving with thumb spica wrist brace, continue to use for now  Oral Tylenol and topical Voltaren gel reviewed as safe OTC options, reviewed safe dosing strategies  On bedside US I appreciate a small ganglion cyst which appears to arise from the proximal carpal row  Also active tenosynovitis of the ECU tendon in the distal ulnar groove  No other clear signs of tenosynovitis or joint effusion  Given chronicity and complexity of pain, advised MRI for further diagnostic clarity  Reviewed that OT and injection options may be available, but will review MRI first and have visit with subspecialty hand team  Can continue to help in the periphery as needed, will otherwise follow hand surgery recs closely   XR images independently visualized and reviewed with patient today in clinic  We discussed modified progressive pain-free activity as tolerated  Home handouts provided and supportive care reviewed  All questions were answered today  Contact us with additional questions or concerns  Signs and sx of concern reviewed      David Camp DO, BECK  Sports Medicine Physician  [facility]    Time spent in chart review, one-on-one evaluation, discussion with patient regarding: nature of problem, clinical course, prior treatments, therapeutic options, shared-decision making, potential procedures and referrals, and charting related to the  visit: 35 minutes.  If applicable, time does not include time spent performing any procedure.        Disclaimer: This note consists of symbols derived from keyboarding, dictation and/or voice recognition software. As a result, there may be errors in the script that have gone undetected. Please consider this when interpreting information found in this chart.

## 2023-09-01 NOTE — LETTER
Follow Up Office Visit      Patient Name: Bianca Boles  : 1980   MRN: [id number]     Chief Complaint:    Chief Complaint   Patient presents with   • Follow-up   • Diabetes   • Hypertension   • Anxiety   • Depression   • Hyperlipidemia       History of Present Illness: Bianca Boles is a 42 y.o. female who is here today to follow up for 3 month follow up DM2, HTN, hyperlipidemia, anxiety, depression, and allergic rhinitis  Review labs    BS check every couple average low 90's    C/O needing a dermatology referral for psoriasis on elbow, would like to start a biologic    mammogram  2022  Colonoscopy NA  Last pap 2020  Eye exam [facility] in [place]    Checking feet daily   Exercising 2x per week     Subjective      Review of Systems:   Review of Systems   Constitutional: Negative for chills and fever.   HENT: Negative for ear pain, sinus pain and sore throat.    Eyes: Negative for visual disturbance.   Respiratory: Negative for shortness of breath.    Gastrointestinal: Negative for abdominal pain, diarrhea, nausea and vomiting.   Endocrine: Negative for polydipsia and polyuria.   Genitourinary: Negative for dysuria.   Musculoskeletal: Negative for myalgias.   Skin: Positive for rash.   Allergic/Immunologic: Positive for environmental allergies.   Neurological: Negative for headaches.   Psychiatric/Behavioral: Negative for sleep disturbance. The patient is not nervous/anxious.         Past Medical History:   Past Medical History:   Diagnosis Date   • Acid reflux    • Allergic rhinitis due to allergen 2016   • Anxiety    • Anxiety and depression    • Benign essential hypertension    • Cancer (HCC)     renal cancer/mass   • Diabetes (HCC)    • Diabetes mellitus (HCC)     type ii, doesn't check bg at home    • Diabetes mellitus, type 2 (HCC)    • Gastroparesis    • GERD (gastroesophageal reflux disease)    • High triglycerides    • History of bariatric surgery 2021   • History of      2023         RE: Scott Fernandez  [id number] [address] [id number]        Dear Colleague,    Thank you for referring your patient, Scott Fernandez, to the [facility]. Please see a copy of my visit note below.    Scott Fernandez  :  1970  DOS: 2023  MRN: [id number]    Sports Medicine Clinic Visit    PCP: Fernando Mcfarlane PA-C    Scott Fernandez is a 53 year old Right hand dominant male who is seen as a WALK IN patient presenting with right wrist pain.    Injury: Gradual onset of pain over the past 5 years. Recently got worse over the past couple months. Pain located over dorsal wrist pain and radiates into the forearm. Additional Features:  Positive: swelling and weakness.  Symptoms are better with rest, ice, ibuprofen, wrist brace- somewhat helps. Symptoms are worse with: using the hand/wrist.  Other evaluation and/or treatments so far consists of: Ice, Ibuprofen, and Rest.  Recent imaging completed: X-rays completed 23.  Prior History of related problems: R CTR - 10+ years ago.     Social History: currently employed as emergency dispatcher     Review of Systems  Musculoskeletal: as above  Remainder of review of systems is negative including constitutional, CV, pulmonary, GI, Skin and Neurologic except as noted in HPI or medical history.    Past Medical History:   Diagnosis Date     Esophageal reflux     GERD     High cholesterol      HTN (hypertension)      PONV (postoperative nausea and vomiting)      Past Surgical History:   Procedure Laterality Date     APPENDECTOMY   approx     ARTHROSCOPY SHOULDER, OPEN ROTATOR CUFF REPAIR, COMBINED  2013    Procedure: COMBINED ARTHROSCOPY SHOULDER, OPEN ROTATOR CUFF REPAIR;  Right Shoulder Arthroscopic Mini-Open Rotator Cuff Repair;  Surgeon: Ley, Jeffrey Duane, MD;  Location: [facility] OR     LAPAROSCOPIC CHOLECYSTECTOMY N/A 2016    Procedure: LAPAROSCOPIC CHOLECYSTECTOMY;   Surgeon: Reg Giang MD;  Location: [facility] OR     ORTHOPEDIC SURGERY  2009    Left Shoulder     SURGICAL HISTORY OF -   1996    shoulder left RCR     SURGICAL HISTORY OF -   1980's    Carpal tunnel procedure 2 on rt wrist and 1 on left wrist     SURGICAL HISTORY OF -   2008    shoulder left     Family History   Problem Relation Age of Onset     Cardiovascular Mother         CHF starting around 50-idiopathic cardiomyopathy     Lipids Father      Cardiovascular Father         Stents, in late 50's     C.A.D. Father      Alcohol/Drug Maternal Grandmother      Alcohol/Drug Maternal Grandfather          Objective  BP (!) 151/93   Pulse 87   Wt 108.9 kg (240 lb)   BMI 33.47 kg/m      General: healthy, alert and in no distress    HEENT: no scleral icterus or conjunctival erythema   Skin: no suspicious lesions or rash. No jaundice.   CV: regular rhythm by palpation, 2+ distal pulses, no pedal edema    Resp: normal respiratory effort without conversational dyspnea   Psych: normal mood and affect    Gait: nonantalgic, appropriate coordination and balance   Neuro: normal light touch sensory exam of the extremities. Motor strength as noted below     Right Wrist and Hand exam    Inspection:       Swelling: broadly over dorsal hand and wrist, no warmth or induration or redness, no drainage    Tender:       Generally over the dorsal wrist      Most focal over dorsal hand, ED tendons and in proximal carpal row near scapholunate junction, also focal over ECU tendon, milder over 1st dorsal compartment and 1st CMC joint and RC joint    Non Tender:       Remainder of the Wrist and Hand right    ROM:       Decreased active and passive ROM of the wrist due to pain    Strength:       5/5 strength in the muscles of the hand, wrist and forearm right    Special Tests:        painful TFCC compression test right and       equivocal Finkelstein's maneuver right    Neurovascular:       2+ radial pulses bilaterally with brisk capillary  kidney stones    • HTN (hypertension)    • Hyperlipemia    • Hyperlipidemia    • Hypertension    • Hypertriglyceridemia    • Kidney stone    • Lumbar herniated disc    • Obesity    • Panic attacks    • PCOS (polycystic ovarian syndrome)    • Psoriasis     ELBOWS   • Seasonal allergies    • Spinal headache     AFTER PAIN EPIDURALS.  NO BLOOD PATCH       Past Surgical History:   Past Surgical History:   Procedure Laterality Date   • ABDOMINAL SURGERY     • ADENOIDECTOMY     •  SECTION  ,   • CYSTOSCOPY BLADDER STONE LITHOTRIPSY     • EAR TUBES     • ENDOSCOPY N/A 10/20/2020    Procedure: ESOPHAGOGASTRODUODENOSCOPY WITH BIOPSY;  Surgeon: Fidel Grajeda Jr., MD;  Location: [facility] ENDOSCOPY;  Service: General;  Laterality: N/A;  PRE- GERD  POST- RETAINED FOOD, GASTRITIS, GASTRIC POLYPS   • ENDOSCOPY     • FOOT FRACTURE SURGERY Left 2017    screw placed   • FRACTURE SURGERY     • GASTRECTOMY     • GASTRIC SLEEVE LAPAROSCOPIC N/A 2020    Procedure: GASTRIC SLEEVE LAPAROSCOPIC;  Surgeon: Fidel Grajeda Jr., MD;  Location: [facility] OR [facility];  Service: Bariatric;  Laterality: N/A;   • KIDNEY SURGERY     • NEPHRECTOMY PARTIAL Right 11/15/2021    Procedure: NEPHRECTOMY PARTIAL LAPAROSCOPIC WITH DAVINCI ROBOT;  Surgeon: Lori Troy MD;  Location: [facility] OR;  Service: Robotics - DaVinci;  Laterality: Right;   • TONSILLECTOMY     • URETEROSCOPY LASER LITHOTRIPSY WITH STENT INSERTION Right 2021    Procedure: CYSTOSCOPY, RIGHT URETEROSCOPY, LASERTRIPSY, STONE BASKET EXTRACTION AND STENT INSERTION;  Surgeon: Lori Troy MD;  Location: [facility] OR;  Service: Urology;  Laterality: Right;       Family History:   Family History   Problem Relation Age of Onset   • Diabetes Father    • Hypertension Father    • Heart disease Father    • Cancer Father         BLADDER   • Colon cancer Father         malignant, currently 62   • Stroke Maternal Grandmother    • Heart disease  Maternal Grandmother    • Diabetes Paternal Grandfather    • Heart disease Paternal Grandfather    • Malig Hyperthermia Neg Hx        Social History:   Social History     Socioeconomic History   • Marital status:    Tobacco Use   • Smoking status: Former Smoker     Years: 35.00     Types: Cigarettes     Quit date: 1/15/2020     Years since quittin.3   • Smokeless tobacco: Never Used   • Tobacco comment: A PACK A WEEK   Vaping Use   • Vaping Use: Never used   Substance and Sexual Activity   • Alcohol use: Yes     Comment: RARELY   • Drug use: Never   • Sexual activity: Defer       Medications:     Current Outpatient Medications:   •  Biotin 49329 MCG tablet, Take 1 tablet by mouth Every Night., Disp: , Rfl:   •  buPROPion XL (WELLBUTRIN XL) 300 MG 24 hr tablet, Take 1 tablet by mouth Daily., Disp: 90 tablet, Rfl: 1  •  CALCIUM-MAGNESIUM-ZINC PO, Take 3 tablets by mouth Daily., Disp: , Rfl:   •  Cetirizine HCl 10 MG capsule, Take 10 mg by mouth every night at bedtime., Disp: 90 capsule, Rfl: 1  •  cholecalciferol (VITAMIN D3) 25 MCG (1000 UT) tablet, Take 2 tablets by mouth Daily., Disp: 180 tablet, Rfl: 1  •  COLLAGEN PO, Take 3 tablets by mouth Daily., Disp: , Rfl:   •  EluRyng 0.12-0.015 MG/24HR vaginal ring, Insert 1 each into the vagina Every 30 (Thirty) Days. (Patient taking differently: Insert 1 each into the vagina Every 30 (Thirty) Days. Removed currently. To be placed on 21), Disp: 3 each, Rfl: 4  •  fluticasone (FLONASE) 50 MCG/ACT nasal spray, 2 sprays into the nostril(s) as directed by provider Daily. 2 sprays each nostril daily, Disp: 3 mL, Rfl: 1  •  IRON-VITAMIN C PO, Take 1 tablet by mouth Daily., Disp: , Rfl:   •  lisinopril (PRINIVIL,ZESTRIL) 2.5 MG tablet, Take 1 tablet by mouth Daily., Disp: 90 tablet, Rfl: 1  •  metFORMIN (GLUCOPHAGE) 1000 MG tablet, Take 1 tablet by mouth 2 (Two) Times a Day With Meals., Disp: 180 tablet, Rfl: 1  •  Multiple Vitamins-Minerals  refill and      normal sensation to light touch in the radial, median and ulnar nerve distributions    Radiology:  Recent Results (from the past 744 hour(s))   XR Wrist Right G/E 3 Views    Narrative    RIGHT WRIST THREE OR MORE VIEWS  8/16/2023 7:47 AM     HISTORY: Right wrist pain.    COMPARISON: None.       Impression    IMPRESSION:  Joint spaces are maintained. Scapholunate interval is  borderline widened. No bony subluxation. There is no evidence of an  acute fracture.     ABHILASH VALDIVIA MD         SYSTEM ID:  [id number]       Assessment:  1. Chronic pain of right wrist    2. Pain and swelling of right wrist    3. Ganglion cyst of wrist, right    4. Right wrist tendinitis        Plan:  Discussed the assessment with the patient.  Follow up: will contact with MRI results, has upcoming appt next week with hand surgery team  Overall improving with thumb spica wrist brace, continue to use for now  Oral Tylenol and topical Voltaren gel reviewed as safe OTC options, reviewed safe dosing strategies  On bedside US I appreciate a small ganglion cyst which appears to arise from the proximal carpal row  Also active tenosynovitis of the ECU tendon in the distal ulnar groove  No other clear signs of tenosynovitis or joint effusion  Given chronicity and complexity of pain, advised MRI for further diagnostic clarity  Reviewed that OT and injection options may be available, but will review MRI first and have visit with subspecialty hand team  Can continue to help in the periphery as needed, will otherwise follow hand surgery recs closely   XR images independently visualized and reviewed with patient today in clinic  We discussed modified progressive pain-free activity as tolerated  Home handouts provided and supportive care reviewed  All questions were answered today  Contact us with additional questions or concerns  Signs and sx of concern reviewed      David Cmap DO, CAQ  Sports Medicine Physician  [facility]  "(MULTIVITAMIN PO), Take 1 tablet by mouth Every Night., Disp: , Rfl:   •  pravastatin (PRAVACHOL) 10 MG tablet, Take 1 tablet by mouth Every Night., Disp: 90 tablet, Rfl: 1  •  Probiotic Product (PROBIOTIC-10 PO), Take 1 tablet by mouth Every Night., Disp: , Rfl:   •  topiramate (TOPAMAX) 50 MG tablet, Take 1 tablet by mouth Every Night for 180 days., Disp: 90 tablet, Rfl: 1  •  traZODone (DESYREL) 50 MG tablet, Take 1 tablet by mouth Every Night., Disp: 90 tablet, Rfl: 1    Allergies:   Allergies   Allergen Reactions   • Morphine Itching and Nausea And Vomiting           Objective     Physical Exam:  Vital Signs:   Vitals:    05/04/22 0742   BP: 108/58   Pulse: 63   Temp: 98.3 °F (36.8 °C)   SpO2: 99%   Weight: 99.8 kg (220 lb)   Height: 170.2 cm (67\")     Body mass index is 34.46 kg/m².     Physical Exam  HENT:      Right Ear: Tympanic membrane normal.      Left Ear: Tympanic membrane normal.      Nose: Nose normal.      Mouth/Throat:      Mouth: Mucous membranes are moist.   Eyes:      Conjunctiva/sclera: Conjunctivae normal.   Neck:      Vascular: No carotid bruit.   Cardiovascular:      Rate and Rhythm: Normal rate and regular rhythm.      Pulses:           Dorsalis pedis pulses are 2+ on the right side and 2+ on the left side.        Posterior tibial pulses are 2+ on the right side and 2+ on the left side.      Heart sounds: Normal heart sounds. No murmur heard.  Pulmonary:      Effort: Pulmonary effort is normal.      Breath sounds: Normal breath sounds.   Abdominal:      General: Bowel sounds are normal.      Palpations: Abdomen is soft.   Musculoskeletal:      Right lower leg: No edema.      Left lower leg: No edema.   Feet:      Right foot:      Protective Sensation: 10 sites tested. 8 sites sensed.      Skin integrity: Skin integrity normal.      Toenail Condition: Right toenails are normal.      Left foot:      Protective Sensation: 10 sites tested. 8 sites sensed.      Skin integrity: Skin integrity " Orthopedics and Sports Medicine    Time spent in chart review, one-on-one evaluation, discussion with patient regarding: nature of problem, clinical course, prior treatments, therapeutic options, shared-decision making, potential procedures and referrals, and charting related to the visit: 35 minutes.  If applicable, time does not include time spent performing any procedure.        Disclaimer: This note consists of symbols derived from keyboarding, dictation and/or voice recognition software. As a result, there may be errors in the script that have gone undetected. Please consider this when interpreting information found in this chart.      Again, thank you for allowing me to participate in the care of your patient.        Sincerely,        Davdi Camp, DO   normal.      Toenail Condition: Left toenails are normal.   Skin:     General: Skin is warm and dry.      Comments: Multiple erythematous scaly plaques bilateral arms   Neurological:      Mental Status: She is alert.   Psychiatric:         Mood and Affect: Mood normal.         Behavior: Behavior normal.             Assessment / Plan      Assessment/Plan:   Diagnoses and all orders for this visit:    1. Diabetes mellitus type 2 in obese (HCC)  -     CBC Auto Differential; Future  -     Comprehensive Metabolic Panel; Future  -     Microalbumin / Creatinine Urine Ratio - Urine, Clean Catch; Future  -     Hemoglobin A1c; Future  -     Lipid Panel; Future  -     CBC & Differential; Future  -     Vitamin B12; Future  -     TSH; Future  -     Urinalysis With Culture If Indicated -; Future    2. Essential hypertension    3. Mixed hyperlipidemia  -     Comprehensive Metabolic Panel; Future  -     Lipid Panel; Future    4. Heartburn    5. Psoriasis  -     Ambulatory Referral to Dermatology    6. Primary insomnia    7. Anxiety    8. Mild episode of recurrent major depressive disorder (HCC)    9. Obesity, Class I, BMI 30-34.9    10. Seasonal allergies    11. S/P laparoscopic sleeve gastrectomy  -     Vitamin B12; Future    Other orders  -     traZODone (DESYREL) 50 MG tablet; Take 1 tablet by mouth Every Night.  Dispense: 90 tablet; Refill: 1  -     pravastatin (PRAVACHOL) 10 MG tablet; Take 1 tablet by mouth Every Night.  Dispense: 90 tablet; Refill: 1  -     metFORMIN (GLUCOPHAGE) 1000 MG tablet; Take 1 tablet by mouth 2 (Two) Times a Day With Meals.  Dispense: 180 tablet; Refill: 1  -     lisinopril (PRINIVIL,ZESTRIL) 2.5 MG tablet; Take 1 tablet by mouth Daily.  Dispense: 90 tablet; Refill: 1  -     Cetirizine HCl 10 MG capsule; Take 10 mg by mouth every night at bedtime.  Dispense: 90 capsule; Refill: 1  -     buPROPion XL (WELLBUTRIN XL) 300 MG 24 hr tablet; Take 1 tablet by mouth Daily.  Dispense: 90 tablet; Refill: 1  -  "    fluticasone (FLONASE) 50 MCG/ACT nasal spray; 2 sprays into the nostril(s) as directed by provider Daily. 2 sprays each nostril daily  Dispense: 3 mL; Refill: 1  -     cholecalciferol (VITAMIN D3) 25 MCG (1000 UT) tablet; Take 2 tablets by mouth Daily.  Dispense: 180 tablet; Refill: 1    Diabetes mellitus type 2 hemoglobin A1c below 6 currently controlled with metformin at this time exercise 30 minutes daily continue weight loss journey  Hyperlipidemia LDL below goal on pravastatin 10 mg at nighttime denies myalgias  Hypertension currently controlled lisinopril 2.5 mg daily provide refills  Reflux currently controlled without medication with diet change  Psoriasis will refer to dermatologist per patient request  Anxiety depression currently controlled with trazodone bupropion will provide refills  Insomnia currently controlled with trazodone  Seasonal allergies stable on Flonase and Zyrtec will provide refills  Status post gastric sleeve we will monitor B12 levels continue vitamin D 2000 units supplementation daily Topamax prescription is provided by bariatric surgery to reduce appetite          Follow Up:   Return in about 6 months (around 11/4/2022).    Kofi Hernandez, APRDEBBIE    \"Please note that portions of this note were completed with a voice recognition program.\"    "

## 2023-09-08 ENCOUNTER — PRE VISIT (OUTPATIENT)
Dept: ORTHOPEDICS | Facility: CLINIC | Age: 53
End: 2023-09-08

## 2023-09-08 ENCOUNTER — TELEPHONE (OUTPATIENT)
Dept: ORTHOPEDICS | Facility: CLINIC | Age: 53
End: 2023-09-08

## 2023-09-09 LAB — NONINV COLON CA DNA+OCC BLD SCRN STL QL: NEGATIVE

## 2023-09-13 ENCOUNTER — HOSPITAL ENCOUNTER (OUTPATIENT)
Dept: MRI IMAGING | Facility: CLINIC | Age: 53
Discharge: HOME | End: 2023-09-13
Attending: FAMILY MEDICINE | Admitting: FAMILY MEDICINE
Payer: COMMERCIAL

## 2023-09-13 DIAGNOSIS — M77.8 RIGHT WRIST TENDINITIS: ICD-10-CM

## 2023-09-13 DIAGNOSIS — M67.431 GANGLION CYST OF WRIST, RIGHT: ICD-10-CM

## 2023-09-13 DIAGNOSIS — M25.431 PAIN AND SWELLING OF RIGHT WRIST: ICD-10-CM

## 2023-09-13 DIAGNOSIS — G89.29 CHRONIC PAIN OF RIGHT WRIST: ICD-10-CM

## 2023-09-13 DIAGNOSIS — M25.531 PAIN AND SWELLING OF RIGHT WRIST: ICD-10-CM

## 2023-09-13 DIAGNOSIS — M25.531 CHRONIC PAIN OF RIGHT WRIST: ICD-10-CM

## 2023-09-13 PROCEDURE — 73221 MRI JOINT UPR EXTREM W/O DYE: CPT | Mod: RT

## 2023-09-13 PROCEDURE — 73221 MRI JOINT UPR EXTREM W/O DYE: CPT | Mod: 26 | Performed by: RADIOLOGY

## 2023-09-19 ASSESSMENT — ENCOUNTER SYMPTOMS
MUSCLE CRAMPS: 0
ARTHRALGIAS: 1
STIFFNESS: 1
NECK PAIN: 0
MYALGIAS: 0
MUSCLE WEAKNESS: 0
JOINT SWELLING: 1
BACK PAIN: 0

## 2023-09-20 DIAGNOSIS — M25.531 RIGHT WRIST PAIN: Primary | ICD-10-CM

## 2023-09-20 NOTE — PROGRESS NOTES
"Hand Surgery History & Physical    REFERRING PHYSICIAN: West Simon   PRIMARY CARE PHYSICIAN: Fernando Mcfarlane     Chief Complaint:   Consult (Consult for right wrist pain)    History of Present Illness:     Scott Fernandez is a 53 year old right-hand dominant male who presents for evaluation of right wrist pain. The patient states that the wrist pain started in 2018 and has progressively worsened over the past couple years. He denies any falls or injuries that he can remember. He notes diffuse radiocarpal wrist pain. Pain is worse with ROM and weight bearing. He has seen several orthopedic providers for this over the years. He has tried hand therapy, bracing, antiinflammatories. He notes several years ago he was evaluated for gout and underwent a wrist aspiration with a dry tap. He was most recently seen by Dr. Camp with sports med in September who obtained MRI.     Pain has worsened over the last several months that he has been unable to use it for most activities. He notes that he's quite hard on his hands and sometimes uses them \"to hammer things\". He states that off the shelf wrist braces haven't provided much relief. He will sometimes wrap his wrist and hand in duct tape to provide support and relief. When he does this he says the pain and swelling improved.     He reports waxing and waning edema to the hand and dorsal wrist. He has tingling and numbness in all five fingers. He denies worsening symptoms at night. He notes intermittent purple/red discoloration to his hand and fingers as well as bruising to the dorsal hand.     Per chart review possible prior history of bilateral carpal tunnel releases in the 1980's. Possible history of revision release on the right as well. Last EMG in 2012.     Occupation: Emergency services dispatcher    Past Medical History:     Past Medical History:   Diagnosis Date    Esophageal reflux     GERD    High cholesterol     HTN (hypertension)     PONV " (postoperative nausea and vomiting)        Past Surgical History:     Past Surgical History:   Procedure Laterality Date    APPENDECTOMY  1990 approx    ARTHROSCOPY SHOULDER, OPEN ROTATOR CUFF REPAIR, COMBINED  7/22/2013    Procedure: COMBINED ARTHROSCOPY SHOULDER, OPEN ROTATOR CUFF REPAIR;  Right Shoulder Arthroscopic Mini-Open Rotator Cuff Repair;  Surgeon: Ley, Jeffrey Duane, MD;  Location: [facility]    LAPAROSCOPIC CHOLECYSTECTOMY N/A 11/16/2016    Procedure: LAPAROSCOPIC CHOLECYSTECTOMY;  Surgeon: Reg Giang MD;  Location: [facility]    ORTHOPEDIC SURGERY  2009    Left Shoulder    SURGICAL HISTORY OF -   1996    shoulder left RCR    SURGICAL HISTORY OF -   1980's    Carpal tunnel procedure 2 on rt wrist and 1 on left wrist    SURGICAL HISTORY OF -   2008    shoulder left       Social History:     Social History     Tobacco Use    Smoking status: Never     Passive exposure: Never    Smokeless tobacco: Never   Substance Use Topics    Alcohol use: No       Family History:     Family History   Problem Relation Age of Onset    Cardiovascular Mother         CHF starting around 50-idiopathic cardiomyopathy    Lipids Father     Cardiovascular Father         Stents, in late 50's    C.A.D. Father     Alcohol/Drug Maternal Grandmother     Alcohol/Drug Maternal Grandfather        Allergies:     Allergies   Allergen Reactions    Nkda [No Known Drug Allergy]        Medications:     No current outpatient medications on file.     No current facility-administered medications for this visit.     Review of Systems:     A 10 point ROS was performed and reviewed. Specific responses to these questions are noted at the end of the document.    Physical Exam:   Physical Exam Adult:  General: Well-nourished, alert, cooperative with exam and in no acute distress. Normal affect.     Musculoskeletal: A focused physical examination of the right upper extremity reveals:   Inspection-  Moderate edema to the dorsal hand and apparent  synovitis to the dorsal wrist. Green discoloration, possible ecchymosis to the dorsal hand and wrist. Hyperemia to the hands and fingers. No deformity.  Otherwise skin is normal appearing.  Normal hair distribution.  Palpation - Tender to palpation diffusely throughout the hand and radiocarpal joint, SL interval, fovea, ECU. Extensor synovitis.   Neurovascular- Decreased sensation to light tough throughout the median, radial, and ulnar nerve distributions. Brisk capillary refill to the distal fingers. 2+ radial pulse.   Range of Motion: ROM of the wrist limited secondary to pain. Wrist extension to 50 degrees, flexion to 5 degrees.  Able to make a full fist  Stability: Patient did not tolerate nelson. No palpable gross instability.   Muscle strength and tone: No atrophy, spasticity or flaccidness. 5/5 strength EPL, FPL, APB, first dorsal interosseous.    Special Tests:    Nerve: two point discrimination:  >10 mm in median and ulnar nerve distributions (4 mm in median and 3 mm in ulnar to the left hand).     Median nerve  Thenar atrophy: none  Carpal tunnel compression test: negative  Phalen's test: negative  Tinel's test:  negative    Ulnar nerve  First dorsal interosseous atrophy:  not present   Froment's sign: negative  Cubital tunnel compression with elbow flexion: negative  Tinel's at the elbow: negative  Nerve subluxation with elbow ROM: Not present.     Tendon: FDS and FDP intact to all fingers. Normal tenodesis effect.     Nelson's test: unable to tolerated secondary to diffuse wrist pain.                 Imaging:   3 view X-ray of the right wrist with bilateral clenched fist view  from 9/22/23 was independently interpreted by me. The results were discussed with the patient.  Findings include: Widening of the right SL interval measuring approximately 4.2 mm. No other acute osseous abnormalities.  No significant degenerative changes of evidence of SLAC wrist.    MRI of the right wrist from 9/13/23 demonstrates  widening of the SL interval and thickening of the dorsal scapholunate ligament.  No evidence of full-thickness scapholunate tears.  Diffuse soft tissue edema throughout the dorsal hand and wrist.  Tenosynovitis to the fourth extensor compartment.    Assessment and Plan:   Assessment:  53 year old male with 5-year history of right wrist pain and swelling.  Radiographs and MRI demonstrate scapholunate interval widening without full-thickness tear consistent with a history of a scapholunate partial tear or injury.  However do not feel patient's current symptoms can be solely attributed to this finding.  Additional differential diagnoses could include CRPS, peripheral nerve compression  (question component of persistent carpal tunnel syndrome as well as ulnar neuropathy given patient's history of prior carpal tunnel release and revision carpal tunnel release on the right), crystalline arthropathy.     Plan: Discussed at length with the patient and his wife.  We reviewed x-rays and MRI imaging.  We discussed variety of treatment options.    Recommended wrist aspiration to send for cell count and crystals.  See procedure note below.  This was a dry tap and no fluid was obtained.  Recommended obtaining inflammatory laboratory studies including ESR and CRP, as well as uric acid.  All lab studies returned within normal limits.    Given his decrease sensation throughout the hand and history of prior carpal tunnel releases recommended obtaining a EMG.     For management of his probable scapholunate injury we could treat this with corticosteroid injections versus trial of strict immobilization.  Given patient has had good symptomatic relief with immobilization, discussed trial of a Exos splint versus cast immobilization.  Patient would like to do a Exos splint. Recommend he wear this full-time for 6 weeks.  He may remove only for hygiene.  Could consider steroid injection in the future after laboratory studies and  imaging.    Follow-up after EMG to discuss results.    Radiographs and plan discussed with Dr. Venkat Walsh who is in agreement with the plan.     Procedure:  After informed consent was obtained, the skin was prepped with chloraprep.  An 18-gauge needle was inserted to the radiocarpal joint via a dorsal approach.  Several attempts were made however no fluid was withdrawn. A bandaid was applied. Post-injection care instructions were given. The patient tolerated the procedure well.     Migdalia Alcantara PA-C   Orthopedic Surgery  9/20/2023 3:10 PM    Total time spent on date of encounter: 35 minutes.    Activities performed: Chart review: Past Orthopedics surgery notes, EMG results , Laboratory studies, and Imaging (MRI/XR), Face to face counseling, and Coordination of care      Answers submitted by the patient for this visit:  Symptoms you have experienced in the last 30 days (Submitted on 9/19/2023)  General Symptoms: No  Skin Symptoms: No  HENT Symptoms: No  EYE SYMPTOMS: No  HEART SYMPTOMS: No  LUNG SYMPTOMS: No  INTESTINAL SYMPTOMS: No  URINARY SYMPTOMS: No  REPRODUCTIVE SYMPTOMS: No  SKELETAL SYMPTOMS: Yes  BLOOD SYMPTOMS: No  NERVOUS SYSTEM SYMPTOMS: No  MENTAL HEALTH SYMPTOMS: No  Please answer the questions below to tell us what condition you are experiencing: (Submitted on 9/19/2023)  Back pain: No  Muscle aches: No  Neck pain: No  Swollen joints: Yes  Joint pain: Yes  Bone pain: No  Muscle cramps: No  Muscle weakness: No  Joint stiffness: Yes  Bone fracture: No

## 2023-09-22 ENCOUNTER — LAB (OUTPATIENT)
Dept: LAB | Facility: CLINIC | Age: 53
End: 2023-09-22
Payer: COMMERCIAL

## 2023-09-22 ENCOUNTER — ANCILLARY PROCEDURE (OUTPATIENT)
Dept: GENERAL RADIOLOGY | Facility: CLINIC | Age: 53
End: 2023-09-22
Attending: PHYSICIAN ASSISTANT
Payer: COMMERCIAL

## 2023-09-22 ENCOUNTER — OFFICE VISIT (OUTPATIENT)
Dept: ORTHOPEDICS | Facility: CLINIC | Age: 53
End: 2023-09-22
Payer: COMMERCIAL

## 2023-09-22 DIAGNOSIS — M25.531 RIGHT WRIST PAIN: ICD-10-CM

## 2023-09-22 DIAGNOSIS — M25.531 RIGHT WRIST PAIN: Primary | ICD-10-CM

## 2023-09-22 DIAGNOSIS — S63.591A: ICD-10-CM

## 2023-09-22 DIAGNOSIS — Z98.890 HISTORY OF CARPAL TUNNEL RELEASE OF BOTH WRISTS: ICD-10-CM

## 2023-09-22 LAB
CRP SERPL-MCNC: <3 MG/L
ERYTHROCYTE [SEDIMENTATION RATE] IN BLOOD BY WESTERGREN METHOD: 9 MM/HR (ref 0–20)
URATE SERPL-MCNC: 6.8 MG/DL (ref 3.4–7)

## 2023-09-22 PROCEDURE — 73100 X-RAY EXAM OF WRIST: CPT | Mod: 52 | Performed by: RADIOLOGY

## 2023-09-22 PROCEDURE — 85652 RBC SED RATE AUTOMATED: CPT | Performed by: PATHOLOGY

## 2023-09-22 PROCEDURE — 20605 DRAIN/INJ JOINT/BURSA W/O US: CPT | Mod: RT | Performed by: PHYSICIAN ASSISTANT

## 2023-09-22 PROCEDURE — 36415 COLL VENOUS BLD VENIPUNCTURE: CPT | Performed by: PATHOLOGY

## 2023-09-22 PROCEDURE — 86140 C-REACTIVE PROTEIN: CPT | Performed by: PATHOLOGY

## 2023-09-22 PROCEDURE — 99207 XR WRIST BILATERAL 1 VIEW: CPT | Mod: 52 | Performed by: RADIOLOGY

## 2023-09-22 PROCEDURE — 84550 ASSAY OF BLOOD/URIC ACID: CPT | Performed by: PATHOLOGY

## 2023-09-22 PROCEDURE — 99214 OFFICE O/P EST MOD 30 MIN: CPT | Mod: 25 | Performed by: PHYSICIAN ASSISTANT

## 2023-09-22 PROCEDURE — 73110 X-RAY EXAM OF WRIST: CPT | Mod: RT | Performed by: RADIOLOGY

## 2023-09-22 NOTE — NURSING NOTE
Reason For Visit:   Chief Complaint   Patient presents with    Consult     Consult for right wrist pain       Primary MD: Fernando Mcfarlane  Ref. MD: Aurora    Age: 53 year old    ?  No      There were no vitals taken for this visit.      Pain Assessment  Patient Currently in Pain: Yes (onset in 2018)  0-10 Pain Scale: 8  Primary Pain Location: Wrist (right)  Pain Descriptors: Other (comment), Numbness, Burning, Constant (swelling)  Alleviating Factors: Ice, Rest, Other (comment), NSAIDS (brace)  Aggravating Factors: Movement    Hand Dominance Evaluation  Hand Dominance: Right          QuickDASH Assessment      9/19/2023    10:15 AM   QuickDASH Main   1. Open a tight or new jar Severe difficulty   2. Do heavy household chores (e.g., wash walls, floors) Severe difficulty   3. Carry a shopping bag or briefcase Moderate difficulty   4. Wash your back Moderate difficulty   5. Use a knife to cut food Moderate difficulty   6. Recreational activities in which you take some force or impact through your arm, shoulder or hand (e.g., golf, hammering, tennis, etc.) Severe difficulty   7. During the past week, to what extent has your arm, shoulder or hand problem interfered with your normal social activities with family, friends, neighbours or groups Quite a bit   8. During the past week, were you limited in your work or other regular daily activities as a result of your arm, shoulder or hand problem Moderately limited   9. Arm, shoulder or hand pain Extreme   10.Tingling (pins and needles) in your arm,shoulder or hand Extreme   11. During the past week, how much difficulty have you had sleeping because of the pain in your arm, shoulder or hand Moderate difficulty   Quickdash Ability Score 68.18          No current outpatient medications on file.       Allergies   Allergen Reactions    Nkda [No Known Drug Allergy]        Aislinn Owens, ATC

## 2023-09-22 NOTE — LETTER
"    9/22/2023         RE: Scott Fernandez  [address] [address] [address]        Dear Colleague,    Thank you for referring your patient, Scott Fernandez, to the [facility] [place]. Please see a copy of my visit note below.    Hand Surgery History & Physical    REFERRING PHYSICIAN: West Simon   PRIMARY CARE PHYSICIAN: Fernando Mcfarlane     Chief Complaint:   Consult (Consult for right wrist pain)    History of Present Illness:     Scott Fernandez is a 53 year old right-hand dominant male who presents for evaluation of right wrist pain. The patient states that the wrist pain started in 2018 and has progressively worsened over the past couple years. He denies any falls or injuries that he can remember. He notes diffuse radiocarpal wrist pain. Pain is worse with ROM and weight bearing. He has seen several orthopedic providers for this over the years. He has tried hand therapy, bracing, antiinflammatories. He notes several years ago he was evaluated for gout and underwent a wrist aspiration with a dry tap. He was most recently seen by Dr. Camp with sports med in September who obtained MRI.     Pain has worsened over the last several months that he has been unable to use it for most activities. He notes that he's quite hard on his hands and sometimes uses them \"to hammer things\". He states that off the shelf wrist braces haven't provided much relief. He will sometimes wrap his wrist and hand in duct tape to provide support and relief. When he does this he says the pain and swelling improved.     He reports waxing and waning edema to the hand and dorsal wrist. He has tingling and numbness in all five fingers. He denies worsening symptoms at night. He notes intermittent purple/red discoloration to his hand and fingers as well as bruising to the dorsal hand.     Per chart review possible prior history of bilateral carpal tunnel releases in the 1980's. " Possible history of revision release on the right as well. Last EMG in 2012.     Occupation: Emergency services dispatcher    Past Medical History:     Past Medical History:   Diagnosis Date    Esophageal reflux     GERD    High cholesterol     HTN (hypertension)     PONV (postoperative nausea and vomiting)        Past Surgical History:     Past Surgical History:   Procedure Laterality Date    APPENDECTOMY  1990 approx    ARTHROSCOPY SHOULDER, OPEN ROTATOR CUFF REPAIR, COMBINED  7/22/2013    Procedure: COMBINED ARTHROSCOPY SHOULDER, OPEN ROTATOR CUFF REPAIR;  Right Shoulder Arthroscopic Mini-Open Rotator Cuff Repair;  Surgeon: Ley, Jeffrey Duane, MD;  Location: [facility]    LAPAROSCOPIC CHOLECYSTECTOMY N/A 11/16/2016    Procedure: LAPAROSCOPIC CHOLECYSTECTOMY;  Surgeon: Reg Giang MD;  Location: [facility]    ORTHOPEDIC SURGERY  2009    Left Shoulder    SURGICAL HISTORY OF -   1996    shoulder left RCR    SURGICAL HISTORY OF -   1980's    Carpal tunnel procedure 2 on rt wrist and 1 on left wrist    SURGICAL HISTORY OF -   2008    shoulder left       Social History:     Social History     Tobacco Use    Smoking status: Never     Passive exposure: Never    Smokeless tobacco: Never   Substance Use Topics    Alcohol use: No       Family History:     Family History   Problem Relation Age of Onset    Cardiovascular Mother         CHF starting around 50-idiopathic cardiomyopathy    Lipids Father     Cardiovascular Father         Stents, in late 50's    C.A.D. Father     Alcohol/Drug Maternal Grandmother     Alcohol/Drug Maternal Grandfather        Allergies:     Allergies   Allergen Reactions    Nkda [No Known Drug Allergy]        Medications:     No current outpatient medications on file.     No current facility-administered medications for this visit.     Review of Systems:     A 10 point ROS was performed and reviewed. Specific responses to these questions are noted at the end of the document.    Physical Exam:    Physical Exam Adult:  General: Well-nourished, alert, cooperative with exam and in no acute distress. Normal affect.     Musculoskeletal: A focused physical examination of the right upper extremity reveals:   Inspection-  Moderate edema to the dorsal hand and apparent synovitis to the dorsal wrist. Green discoloration, possible ecchymosis to the dorsal hand and wrist. Hyperemia to the hands and fingers. No deformity.  Otherwise skin is normal appearing.  Normal hair distribution.  Palpation - Tender to palpation diffusely throughout the hand and radiocarpal joint, SL interval, fovea, ECU. Extensor synovitis.   Neurovascular- Decreased sensation to light tough throughout the median, radial, and ulnar nerve distributions. Brisk capillary refill to the distal fingers. 2+ radial pulse.   Range of Motion: ROM of the wrist limited secondary to pain. Wrist extension to 50 degrees, flexion to 5 degrees.  Able to make a full fist  Stability: Patient did not tolerate nelson. No palpable gross instability.   Muscle strength and tone: No atrophy, spasticity or flaccidness. 5/5 strength EPL, FPL, APB, first dorsal interosseous.    Special Tests:    Nerve: two point discrimination:  >10 mm in median and ulnar nerve distributions (4 mm in median and 3 mm in ulnar to the left hand).     Median nerve  Thenar atrophy: none  Carpal tunnel compression test: negative  Phalen's test: negative  Tinel's test:  negative    Ulnar nerve  First dorsal interosseous atrophy:  not present   Froment's sign: negative  Cubital tunnel compression with elbow flexion: negative  Tinel's at the elbow: negative  Nerve subluxation with elbow ROM: Not present.     Tendon: FDS and FDP intact to all fingers. Normal tenodesis effect.     Nelson's test: unable to tolerated secondary to diffuse wrist pain.                 Imaging:   3 view X-ray of the right wrist with bilateral clenched fist view  from 9/22/23 was independently interpreted by me. The results  were discussed with the patient.  Findings include: Widening of the right SL interval measuring approximately 4.2 mm. No other acute osseous abnormalities.  No significant degenerative changes of evidence of SLAC wrist.    MRI of the right wrist from 9/13/23 demonstrates widening of the SL interval and thickening of the dorsal scapholunate ligament.  No evidence of full-thickness scapholunate tears.  Diffuse soft tissue edema throughout the dorsal hand and wrist.  Tenosynovitis to the fourth extensor compartment.    Assessment and Plan:   Assessment:  53 year old male with 5-year history of right wrist pain and swelling.  Radiographs and MRI demonstrate scapholunate interval widening without full-thickness tear consistent with a history of a scapholunate partial tear or injury.  However do not feel patient's current symptoms can be solely attributed to this finding.  Additional differential diagnoses could include CRPS, peripheral nerve compression  (question component of persistent carpal tunnel syndrome as well as ulnar neuropathy given patient's history of prior carpal tunnel release and revision carpal tunnel release on the right), crystalline arthropathy.     Plan: Discussed at length with the patient and his wife.  We reviewed x-rays and MRI imaging.  We discussed variety of treatment options.    Recommended wrist aspiration to send for cell count and crystals.  See procedure note below.  This was a dry tap and no fluid was obtained.  Recommended obtaining inflammatory laboratory studies including ESR and CRP, as well as uric acid.  All lab studies returned within normal limits.    Given his decrease sensation throughout the hand and history of prior carpal tunnel releases recommended obtaining a EMG.     For management of his probable scapholunate injury we could treat this with corticosteroid injections versus trial of strict immobilization.  Given patient has had good symptomatic relief with immobilization,  discussed trial of a Exos splint versus cast immobilization.  Patient would like to do a Exos splint. Recommend he wear this full-time for 6 weeks.  He may remove only for hygiene.  Could consider steroid injection in the future after laboratory studies and imaging.    Follow-up after EMG to discuss results.    Radiographs and plan discussed with Dr. Venkat Walsh who is in agreement with the plan.     Procedure:  After informed consent was obtained, the skin was prepped with chloraprep.  An 18-gauge needle was inserted to the radiocarpal joint via a dorsal approach.  Several attempts were made however no fluid was withdrawn. A bandaid was applied. Post-injection care instructions were given. The patient tolerated the procedure well.     Migdalia Alcantara PA-C   Orthopedic Surgery  9/20/2023 3:10 PM    Total time spent on date of encounter: 35 minutes.    Activities performed: Chart review: Past Orthopedics surgery notes, EMG results , Laboratory studies, and Imaging (MRI/XR), Face to face counseling, and Coordination of care      Answers submitted by the patient for this visit:  Symptoms you have experienced in the last 30 days (Submitted on 9/19/2023)  General Symptoms: No  Skin Symptoms: No  HENT Symptoms: No  EYE SYMPTOMS: No  HEART SYMPTOMS: No  LUNG SYMPTOMS: No  INTESTINAL SYMPTOMS: No  URINARY SYMPTOMS: No  REPRODUCTIVE SYMPTOMS: No  SKELETAL SYMPTOMS: Yes  BLOOD SYMPTOMS: No  NERVOUS SYSTEM SYMPTOMS: No  MENTAL HEALTH SYMPTOMS: No  Please answer the questions below to tell us what condition you are experiencing: (Submitted on 9/19/2023)  Back pain: No  Muscle aches: No  Neck pain: No  Swollen joints: Yes  Joint pain: Yes  Bone pain: No  Muscle cramps: No  Muscle weakness: No  Joint stiffness: Yes  Bone fracture: No      DME FITTING    Relevant Diagnosis: Right wrist pain  EXOS, Fx brace, short arm, open thumb, Rt, LG brace was fit on patient's Right Wrist.     Person(s) involved in teaching:   Patient    Brace  was applied in standard Manner:  Yes  Brace fit well:  Yes  Patient reports brace to fit comfortably:  Yes    Education:   Patient shown self application and removal of brace: Yes  Patient shown how to adjust brace fit, if necessary: Yes  Patient educated on billing and return policy: Yes  Patient confirmed understanding when and how to contact clinic with concerns: Yes      Medium Joint Injection/Arthrocentesis: R radiocarpal    Date/Time: 9/22/2023 3:50 PM    Performed by: Migdalia Alcantara PA-C  Authorized by: Migdalia Alcantara PA-C    Indications:  Diagnostic evaluation  Needle Size:  18 G  Guidance: surface landmarks    Approach:  Dorsal  Location:  Wrist  Site:  R radiocarpal  Aspirate amount (mL):  0  Outcome:  Tolerated well, no immediate complications  Procedure discussed: discussed risks, benefits, and alternatives    Consent Given by:  Patient  Timeout: timeout called immediately prior to procedure    Prep: patient was prepped and draped in usual sterile fashion     Dry tap

## 2023-09-22 NOTE — PROGRESS NOTES
DME FITTING    Relevant Diagnosis: Right wrist pain  EXOS, Fx brace, short arm, open thumb, Rt, LG brace was fit on patient's Right Wrist.     Person(s) involved in teaching:   Patient    Brace was applied in standard Manner:  Yes  Brace fit well:  Yes  Patient reports brace to fit comfortably:  Yes    Education:   Patient shown self application and removal of brace: Yes  Patient shown how to adjust brace fit, if necessary: Yes  Patient educated on billing and return policy: Yes  Patient confirmed understanding when and how to contact clinic with concerns: Yes

## 2023-09-22 NOTE — PROGRESS NOTES
Medium Joint Injection/Arthrocentesis: R radiocarpal    Date/Time: 9/22/2023 3:50 PM    Performed by: Migdalia Alcantara PA-C  Authorized by: Migdalia Alcantara PA-C    Indications:  Diagnostic evaluation  Needle Size:  18 G  Guidance: surface landmarks    Approach:  Dorsal  Location:  Wrist  Site:  R radiocarpal  Aspirate amount (mL):  0  Outcome:  Tolerated well, no immediate complications  Procedure discussed: discussed risks, benefits, and alternatives    Consent Given by:  Patient  Timeout: timeout called immediately prior to procedure    Prep: patient was prepped and draped in usual sterile fashion     Dry tap

## 2023-09-25 ENCOUNTER — MYC MEDICAL ADVICE (OUTPATIENT)
Dept: ORTHOPEDICS | Facility: CLINIC | Age: 53
End: 2023-09-25
Payer: COMMERCIAL

## 2023-10-03 ENCOUNTER — TRANSFERRED RECORDS (OUTPATIENT)
Dept: HEALTH INFORMATION MANAGEMENT | Facility: CLINIC | Age: 53
End: 2023-10-03
Payer: COMMERCIAL

## 2023-10-09 ENCOUNTER — OFFICE VISIT (OUTPATIENT)
Dept: ORTHOPEDICS | Facility: CLINIC | Age: 53
End: 2023-10-09
Payer: COMMERCIAL

## 2023-10-09 DIAGNOSIS — M25.531 RIGHT WRIST PAIN: ICD-10-CM

## 2023-10-09 DIAGNOSIS — M65.931 EXTENSOR TENOSYNOVITIS OF RIGHT WRIST: Primary | ICD-10-CM

## 2023-10-09 PROCEDURE — 99214 OFFICE O/P EST MOD 30 MIN: CPT | Performed by: PHYSICIAN ASSISTANT

## 2023-10-09 RX ORDER — MELOXICAM 7.5 MG/1
7.5 TABLET ORAL DAILY
Qty: 90 TABLET | Refills: 0 | Status: SHIPPED | OUTPATIENT
Start: 2023-10-09 | End: 2024-01-07

## 2023-10-09 RX ORDER — METHYLPREDNISOLONE 4 MG
TABLET, DOSE PACK ORAL
Qty: 21 TABLET | Refills: 0 | Status: SHIPPED | OUTPATIENT
Start: 2023-10-09 | End: 2024-01-15

## 2023-10-09 NOTE — LETTER
October 9, 2023      RE: Scott Fernandez  [address] [address] [id number]        To whom it may concern:    Scott Fernandez is under my professional care for right wrist. He is unable to work until next evaluation on 11/20/23.    Sincerely,    Venkat Walsh MD

## 2023-10-09 NOTE — NURSING NOTE
Reason For Visit:   Chief Complaint   Patient presents with    RECHECK     Follow up on right wrist pain       Primary MD: Fernando Mcfarlane  Ref. MD: saul    Age: 53 year old    ?  No      There were no vitals taken for this visit.      Pain Assessment  Patient Currently in Pain: Yes  0-10 Pain Scale: 8  Primary Pain Location: Wrist (right)  Pain Descriptors: Aching, Constant, Throbbing, Sharp  Alleviating Factors: Pain medication, Other (comment) (brace)    Hand Dominance Evaluation  Hand Dominance: Right          QuickDASH Assessment      10/4/2023     7:07 PM   QuickDASH Main   1. Open a tight or new jar Severe difficulty   2. Do heavy household chores (e.g., wash walls, floors) Severe difficulty   3. Carry a shopping bag or briefcase Mild difficulty   4. Wash your back Moderate difficulty   5. Use a knife to cut food Moderate difficulty   6. Recreational activities in which you take some force or impact through your arm, shoulder or hand (e.g., golf, hammering, tennis, etc.) Severe difficulty   7. During the past week, to what extent has your arm, shoulder or hand problem interfered with your normal social activities with family, friends, neighbours or groups Quite a bit   8. During the past week, were you limited in your work or other regular daily activities as a result of your arm, shoulder or hand problem Moderately limited   9. Arm, shoulder or hand pain Severe   10.Tingling (pins and needles) in your arm,shoulder or hand Moderate   11. During the past week, how much difficulty have you had sleeping because of the pain in your arm, shoulder or hand Moderate difficulty   Quickdash Ability Score 59.09          No current outpatient medications on file.       Allergies   Allergen Reactions    Nkda [No Known Drug Allergy]        Aislinn Owens, ATC     56.95

## 2023-10-09 NOTE — LETTER
10/9/2023         RE: Scott Fernandez  [address] [address] [address]        Dear Colleague,    Thank you for referring your patient, Scott Fernandez, to the [facility] [place]. Please see a copy of my visit note below.    Date of Service: Oct 9, 2023    Chief Complaint:   Chief Complaint   Patient presents with    RECHECK     Follow up on right wrist pain       Interval events: Scott Fernandez is a 53 year old male who presents today in follow-up to discuss EMG results.  He was last seen a few weeks ago at which time he was placed in an Exos splint.  He has been wearing all this time except for hygiene.  He reports pain is mildly improved in the splint but he continues to have persistent pain that is debilitating and limiting to his activities of daily living.  The patient has continued to work.  He states that he does have difficulty using a computer mouse and keyboard due to the pain in his wrist.  He reports persistent dense diffuse numbness throughout the entire hand.  He has not been taking any anti-inflammatories.  He is taking Tylenol.    The past medical history was reviewed updated in the EMR. This includes medications, surgeries, social history, and review of systems.    Physical examination:  Well-developed, well-nourished and in no acute distress.  Alert and oriented to surroundings.  Musculoskeletal: A focused physical examination of the right upper extremity reveals:   Inspection-minimal edema to the right hand, improved from prior.  Skin is clean dry and intact.  Mild hyperemia to the right hand and fingers.  \No deformity.  Otherwise skin is normal appearing.  Normal hair distribution.  Palpation - Tender to palpation diffusely throughout the dorsal hand and radiocarpal joint, SL interval, fovea, ECU. Extensor synovitis.   Neurovascular- Decreased sensation to light tough throughout the median, radial, and ulnar nerve distributions. Brisk  capillary refill to the distal fingers. 2+ radial pulse.   Range of Motion: ROM of the wrist limited secondary to pain. Wrist extension to 50 degrees, flexion to 5 degrees.  Able to make a full fist  Stability: Patient did not tolerate nelson. No palpable gross instability.   Muscle strength and tone: No atrophy, spasticity or flaccidness. 5/5 strength EPL, FPL, APB, first dorsal interosseous.     Special Tests:     Nerve: two point discrimination:  >10 mm in median and ulnar nerve distributions (5 mm in median and 6 mm in ulnar to the left hand).      Median nerve  Thenar atrophy: none  Carpal tunnel compression test: negative  Phalen's test: negative  Tinel's test:  negative     Ulnar nerve  First dorsal interosseous atrophy:  not present   Froment's sign: negative  Cubital tunnel compression with elbow flexion: negative  Tinel's at the elbow: negative  Nerve subluxation with elbow ROM: Not present.      Tendon: FDS and FDP intact to all fingers. Normal tenodesis effect.      Nelson's test: unable to tolerated secondary to diffuse wrist pain.       Radiographs:   3 view X-ray of the right wrist with bilateral clenched fist view  from 9/22/23 was independently interpreted by me. The results were discussed with the patient.  Findings include: Widening of the right SL interval measuring approximately 4.2 mm. No other acute osseous abnormalities.  No significant degenerative changes of evidence of SLAC wrist.     MRI of the right wrist from 9/13/23 demonstrates widening of the SL interval and thickening of the dorsal scapholunate ligament.  No evidence of full-thickness scapholunate tears.  Diffuse soft tissue edema throughout the dorsal hand and wrist.  Tenosynovitis to the fourth extensor compartment.    EMG/NCS: EMG obtained on 10/4/2023 at [facility] demonstrates no evidence of peripheral nerve compression (carpal tunnel or cubital tunnel) plexopathy or cervical radiculopathy.  Chronic  changes to the right APB nonspecific and could be due to prior carpal tunnel syndrome and surgical release.    Assessment: 53 year old male with  right wrist pain.  Normal EMG. Radiographs and MRI demonstrate scapholunate interval widening without full-thickness tear consistent with a history of a scapholunate partial tear or injury, however do not feel that is a sole contributor to his wrist pain.  MRI with diffuse dorsal wrist soft tissue swelling.    Plan:   -Trial of strict immobilization with Exos splint.  Only remove for gentle hygiene.  -Limited use of the right hand, work note for no work for 6 weeks.  Encouraged gentle regular finger range of motion to prevent stiffness.  -Medrol Dosepak, take as directed.  -Meloxicam once daily in the morning with food.  -We did discuss the potential for rheumatology referral given this nonspecific inflammation if no improvement from immobilization and steroids.  Patient would like to get this scheduled at this time.  Referral placed.  -Follow-up in 6 weeks for recheck.    Plan discussed with Dr. Hebert Walsh who is in agreement.    Total time spent on date of encounter separate from Dr. Venkat Walsh: 30 minutes.   Activities performed: Chart review: EMG results  and Imaging, Face to face counseling, and Coordination of care      WANDA SARGENT PA-C  Orthopaedic Surgery

## 2023-10-11 NOTE — PROGRESS NOTES
Date of Service: Oct 9, 2023    Chief Complaint:   Chief Complaint   Patient presents with    RECHECK     Follow up on right wrist pain       Interval events: Scott Fernandez is a 53 year old male who presents today in follow-up to discuss EMG results.  He was last seen a few weeks ago at which time he was placed in an Exos splint.  He has been wearing all this time except for hygiene.  He reports pain is mildly improved in the splint but he continues to have persistent pain that is debilitating and limiting to his activities of daily living.  The patient has continued to work.  He states that he does have difficulty using a computer mouse and keyboard due to the pain in his wrist.  He reports persistent dense diffuse numbness throughout the entire hand.  He has not been taking any anti-inflammatories.  He is taking Tylenol.    The past medical history was reviewed updated in the EMR. This includes medications, surgeries, social history, and review of systems.    Physical examination:  Well-developed, well-nourished and in no acute distress.  Alert and oriented to surroundings.  Musculoskeletal: A focused physical examination of the right upper extremity reveals:   Inspection-minimal edema to the right hand, improved from prior.  Skin is clean dry and intact.  Mild hyperemia to the right hand and fingers.  \No deformity.  Otherwise skin is normal appearing.  Normal hair distribution.  Palpation - Tender to palpation diffusely throughout the dorsal hand and radiocarpal joint, SL interval, fovea, ECU. Extensor synovitis.   Neurovascular- Decreased sensation to light tough throughout the median, radial, and ulnar nerve distributions. Brisk capillary refill to the distal fingers. 2+ radial pulse.   Range of Motion: ROM of the wrist limited secondary to pain. Wrist extension to 50 degrees, flexion to 5 degrees.  Able to make a full fist  Stability: Patient did not tolerate nelson. No palpable gross instability.    Muscle strength and tone: No atrophy, spasticity or flaccidness. 5/5 strength EPL, FPL, APB, first dorsal interosseous.     Special Tests:     Nerve: two point discrimination:  >10 mm in median and ulnar nerve distributions (5 mm in median and 6 mm in ulnar to the left hand).      Median nerve  Thenar atrophy: none  Carpal tunnel compression test: negative  Phalen's test: negative  Tinel's test:  negative     Ulnar nerve  First dorsal interosseous atrophy:  not present   Froment's sign: negative  Cubital tunnel compression with elbow flexion: negative  Tinel's at the elbow: negative  Nerve subluxation with elbow ROM: Not present.      Tendon: FDS and FDP intact to all fingers. Normal tenodesis effect.      Sterling's test: unable to tolerated secondary to diffuse wrist pain.       Radiographs:   3 view X-ray of the right wrist with bilateral clenched fist view  from 9/22/23 was independently interpreted by me. The results were discussed with the patient.  Findings include: Widening of the right SL interval measuring approximately 4.2 mm. No other acute osseous abnormalities.  No significant degenerative changes of evidence of SLAC wrist.     MRI of the right wrist from 9/13/23 demonstrates widening of the SL interval and thickening of the dorsal scapholunate ligament.  No evidence of full-thickness scapholunate tears.  Diffuse soft tissue edema throughout the dorsal hand and wrist.  Tenosynovitis to the fourth extensor compartment.    EMG/NCS: EMG obtained on 10/4/2023 at [facility] demonstrates no evidence of peripheral nerve compression (carpal tunnel or cubital tunnel) plexopathy or cervical radiculopathy.  Chronic changes to the right APB nonspecific and could be due to prior carpal tunnel syndrome and surgical release.    Assessment: 53 year old male with  right wrist pain.  Normal EMG. Radiographs and MRI demonstrate scapholunate interval widening without full-thickness tear  consistent with a history of a scapholunate partial tear or injury, however do not feel that is a sole contributor to his wrist pain.  MRI with diffuse dorsal wrist soft tissue swelling.    Plan:   -Trial of strict immobilization with Exos splint.  Only remove for gentle hygiene.  -Limited use of the right hand, work note for no work for 6 weeks.  Encouraged gentle regular finger range of motion to prevent stiffness.  -Medrol Dosepak, take as directed.  -Meloxicam once daily in the morning with food.  -We did discuss the potential for rheumatology referral given this nonspecific inflammation if no improvement from immobilization and steroids.  Patient would like to get this scheduled at this time.  Referral placed.  -Follow-up in 6 weeks for recheck.    Plan discussed with Dr. Hebert Walsh who is in agreement.    Total time spent on date of encounter separate from Dr. Venkat Walsh: 30 minutes.   Activities performed: Chart review: EMG results  and Imaging, Face to face counseling, and Coordination of care      WANDA SARGENT PA-C  Orthopaedic Surgery

## 2023-10-13 ENCOUNTER — MYC MEDICAL ADVICE (OUTPATIENT)
Dept: ORTHOPEDICS | Facility: CLINIC | Age: 53
End: 2023-10-13

## 2023-10-18 ENCOUNTER — DOCUMENTATION ONLY (OUTPATIENT)
Dept: ORTHOPEDICS | Facility: CLINIC | Age: 53
End: 2023-10-18
Payer: COMMERCIAL

## 2023-10-18 NOTE — PROGRESS NOTES
Received Completed forms Yes   Faxed Forms Faxed To: [employer] [facility]  Fax Number: [phone]   Sent to HIM (Date) 10/18/23

## 2023-11-20 ENCOUNTER — OFFICE VISIT (OUTPATIENT)
Dept: ORTHOPEDICS | Facility: CLINIC | Age: 53
End: 2023-11-20
Payer: COMMERCIAL

## 2023-11-20 DIAGNOSIS — M65.931 EXTENSOR TENOSYNOVITIS OF RIGHT WRIST: Primary | ICD-10-CM

## 2023-11-20 DIAGNOSIS — M25.531 RIGHT WRIST PAIN: ICD-10-CM

## 2023-11-20 PROCEDURE — 99213 OFFICE O/P EST LOW 20 MIN: CPT | Mod: 25 | Performed by: STUDENT IN AN ORGANIZED HEALTH CARE EDUCATION/TRAINING PROGRAM

## 2023-11-20 PROCEDURE — 20550 NJX 1 TENDON SHEATH/LIGAMENT: CPT | Mod: RT | Performed by: STUDENT IN AN ORGANIZED HEALTH CARE EDUCATION/TRAINING PROGRAM

## 2023-11-20 RX ORDER — LIDOCAINE HYDROCHLORIDE 10 MG/ML
1 INJECTION, SOLUTION EPIDURAL; INFILTRATION; INTRACAUDAL; PERINEURAL
Status: SHIPPED | OUTPATIENT
Start: 2023-11-20

## 2023-11-20 RX ORDER — DEXAMETHASONE SODIUM PHOSPHATE 4 MG/ML
4 INJECTION, SOLUTION INTRA-ARTICULAR; INTRALESIONAL; INTRAMUSCULAR; INTRAVENOUS; SOFT TISSUE
Status: SHIPPED | OUTPATIENT
Start: 2023-11-20

## 2023-11-20 RX ADMIN — DEXAMETHASONE SODIUM PHOSPHATE 4 MG: 4 INJECTION, SOLUTION INTRA-ARTICULAR; INTRALESIONAL; INTRAMUSCULAR; INTRAVENOUS; SOFT TISSUE at 11:41

## 2023-11-20 RX ADMIN — LIDOCAINE HYDROCHLORIDE 1 ML: 10 INJECTION, SOLUTION EPIDURAL; INFILTRATION; INTRACAUDAL; PERINEURAL at 11:41

## 2023-11-20 NOTE — LETTER
2023         RE: Scott Fernandez  [address] [address] [facility] [address]        Dear Colleague,    Thank you for referring your patient, Scott Fernandez, to the [facility]. Please see a copy of my visit note below.    Hand / Upper Extremity Injection/Arthrocentesis    Date/Time: 2023 11:41 AM    Performed by: Venkat Walsh MD  Authorized by: Venkat Walsh MD    Indications:  Pain  Needle Size:  25 G  Guidance: landmark     Condition comment:  Right 4th extensor compartment     Medications:  4 mg dexAMETHasone 4 MG/ML; 1 mL lidocaine (PF) 1 %  Outcome:  Tolerated well, no immediate complications  Procedure discussed: discussed risks, benefits, and alternatives    Consent Given by:  Patient  Timeout: timeout called immediately prior to procedure    Prep: patient was prepped and draped in usual sterile fashion      [facility] [address] [address]  [phone]  Dept: [phone]  ______________________________________________________________________________    Patient: Scott Fernandez   : 1970   MRN: [id number]   2023    INVASIVE PROCEDURE SAFETY CHECKLIST    Date: 2023   Procedure:Right 4th extensor compartment steroid injection  Patient Name: Scott Fernandez  MRN: [id number]  YOB: 1970    Action: Complete sections as appropriate. Any discrepancy results in a HARD COPY until resolved.     PRE PROCEDURE:  Patient ID verified with 2 identifiers (name and  or MRN): Yes  Procedure and site verified with patient/designee (when able): Yes  Accurate consent documentation in medical record: Yes  H&P (or appropriate assessment) documented in medical record: Yes  H&P must be up to 20 days prior to procedure and updates within 24 hours of procedure as applicable: Yes  Relevant diagnostic and radiology test results appropriately  labeled and displayed as applicable: NA  Procedure site(s) marked with provider initials: NA    TIMEOUT:  Time-Out performed immediately prior to starting procedure, including verbal and active participation of all team members addressing the following:Yes  * Correct patient identify  * Confirmed that the correct side and site are marked  * An accurate procedure consent form  * Agreement on the procedure to be done  * Correct patient position  * Relevant images and results are properly labeled and appropriately displayed  * The need to administer antibiotics or fluids for irrigation purposes during the procedure as applicable   * Safety precautions based on patient history or medication use    DURING PROCEDURE: Verification of correct person, site, and procedures any time the responsibility for care of the patient is transferred to another member of the care team.       The following medications were given:         Prior to injection, verified patient identity using patient's name and date of birth.  Due to injection administration, patient instructed to remain in clinic for 15 minutes  afterwards, and to report any adverse reaction to me immediately.    Tendon sheath injection was performed.   Medication Name: Dexamethasone Sodium Phosphate NDC 01286-946-80  Drug Amount Wasted:  None.  Vial/Syringe: Multi dose vial  Expiration Date:  7/1/24    Medication Name Lidocaine 1% NDC 80677-255-12    Scribed by CEDRICK BENNETT, PAKO for Dr. Walsh on November 20, 2023 at 11:45a based on the provider's statements to me.     CEDRICK BENNETT ATC        Date of Service: Nov 20, 2023    Chief Complaint:   Chief Complaint   Patient presents with    RECHECK     6 week follow-up Right wrist pain,   extensor tenosynovitis     Interval events: Scott Fernandez is a 53 year old male who presents today in follow-up.  He has been wearing Exos splint full-time except for hygiene for the past 6 weeks.  He completed a course of oral steroids and  has been taking meloxicam once daily.  He has also been off work since he was last seen.  Patient states that his swelling has improved quite a bit.  Pain is focal over the dorsal wrist and mostly unchanged.  He has pain with wrist range of motion.  Numbness and tingling to the hand is intermittent, mildly improved..  It mostly affects the thumb index ring and small finger.    Patient would like to try go back to work next Monday.    And has a rheumatology visit scheduled for May 2024.    The past medical history was reviewed updated in the EMR. This includes medications, surgeries, social history, and review of systems.    Physical examination:  Well-developed, well-nourished and in no acute distress.  Alert and oriented to surroundings.  Musculoskeletal: A focused physical examination of the right upper extremity reveals:   Inspection- minimal edema to the right hand, significantly improved from prior.  Skin is clean dry and intact.  No deformity.  Otherwise skin is normal appearing.  Normal hair distribution.  Palpation - Tender to palpation diffusely throughout the dorsal radiocarpal joint and along the extensor tendons. Extensor synovitis.   Neurovascular- Decreased sensation to light tough throughout the median, radial, and ulnar nerve distributions. Positive Tinel's at the carpal tunnel.  Positive carpal tunnel compression test.  Brisk capillary refill to the distal fingers. 2+ radial pulse.   Range of Motion: ROM of the wrist limited secondary to pain. Wrist extension to 50 degrees, flexion to 5 degrees.  Able to make a full fist  Stability: Sterling difficult to assess given patient's pain, but feels relatively stable.  No palpable gross instability.   Muscle strength and tone: No atrophy, spasticity or flaccidness. 5/5 strength EPL, FPL, APB, first dorsal interosseous.     Special Tests:     Nerve: two point discrimination:  >10 mm in median and ulnar nerve distributions (5 mm in median and 6 mm in ulnar to  the left hand).      Median nerve  Thenar atrophy: none  Carpal tunnel compression test: Positive  Phalen's test: negative  Tinel's test: Positive     Ulnar nerve  First dorsal interosseous atrophy:  not present   Froment's sign: negative  Cubital tunnel compression with elbow flexion: negative  Tinel's at the elbow: negative  Nerve subluxation with elbow ROM: Not present.      Tendon: FDS and FDP intact to all fingers. Normal tenodesis effect.      Sterling's test: unable to tolerate secondary to diffuse wrist pain.       Radiographs:   3 view X-ray of the right wrist with bilateral clenched fist view  from 9/22/23 was independently interpreted by me. The results were discussed with the patient.  Findings include: Widening of the right SL interval measuring approximately 4.2 mm. No other acute osseous abnormalities.  No significant degenerative changes of evidence of SLAC wrist.     MRI of the right wrist from 9/13/23 demonstrates widening of the SL interval and thickening of the dorsal scapholunate ligament.  No evidence of full-thickness scapholunate tears.  Diffuse soft tissue edema throughout the dorsal hand and wrist.  Tenosynovitis to the fourth extensor compartment.    EMG/NCS: EMG obtained on 10/4/2023 at [facility] demonstrates no evidence of peripheral nerve compression (carpal tunnel or cubital tunnel) plexopathy or cervical radiculopathy.  Chronic changes to the right APB nonspecific and could be due to prior carpal tunnel syndrome and surgical release.    Assessment: 53 year old male with  right wrist pain.  Normal EMG. Radiographs and MRI demonstrate scapholunate interval widening without full-thickness tear consistent with a history of a scapholunate partial tear or injury, however do not feel that is a sole contributor to his wrist pain.  MRI with diffuse dorsal wrist soft tissue swelling, suspect pain likely due to extensor tenosynovitis.    Plan:   -Discussed option of  corticosteroid injection into the fourth dorsal compartment.  Patient agreeable.  This was performed by Dr. Hebert Walsh.  See separate documentation Dr. Walsh.  Discussed that he may need a series of injections for relief.  Patient will contact us if he gets any improvement, even short-term from the steroid injection.  We discussed that this injection may be therapeutic and diagnostic.  Should we be able to locate his symptoms to the extensor tenosynovitis could consider a extensor tendon debridement in the future.  -We also discussed that his numbness and tingling could be median nerve damage from his prior to carpal tunnel releases.  We could consider a nerve ultrasound.  We also discussed the possibility of an extensor tendon ultrasound in a few weeks if symptoms persist.  - Continue with Exos splint while at work.  Okay to return to work next Monday, 11/27/2023.  Work note provided  - Encouraged gentle regular finger range of motion to prevent stiffness.  - Meloxicam once daily in the morning with food.  - Recommend rheumatology consult, currently scheduled for May 2024.  - Follow-up in 6 weeks for recheck.     Plan discussed with Dr. Hebert Walsh who is in agreement.    Total time spent on date of encounter separate from Dr. Venkat Walsh and procedure time: 25 minutes.   Activities performed: Chart review: , Face to face counseling, and Coordination of care, and documentation.       WANDA SARGENT PA-C  Orthopaedic Surgery        Attestation signed by Venkat Walsh MD at 11/20/2023 10:10 PM:  I saw and evaluated the patient and agree with the findings and plan of care as documented in the note.    Briefly this is a 53 year old male with dorsal wrist pain. He has been using the exos brace and has been off work since I last saw him 6 weeks ago. Symptoms have improved minimally. Swelling has improved. Patient is most symptomatic over the dorsal wrist over 4th dorsal compartment tendons just distal to extensor retinaculum. He states  symptoms are aggravated by repetitive wrist ROM, and extension worse than flexion. MRI demonstrates significant tenosynovitis of the 4th dorsal compartment tendons. His exam is most consistent with extensor retinaculum impingement and 4th extensor compartment tenosynovitis. He does have some  SL diastasis and thickening of the dorsal and volar SL ligament. Sterling shift appears to be negative, but he guards significantly and exam is not reliable. His numbness/tingling is intermittent. He thinks that he previously had complete relief after revision carpal tunnel release years ago.    At this point I have recommended trial of steroid injections. Patient states he has not had good success with prior injections of steroid in his shoulder but is willing to try. After obtaining written consent, dorsal wrist was cleansed with chlorhexidine. 1cc 1% lidocaine and 1cc dexamethasone 4 mg/mL was injected at the site of maximal tenderness over 4th dorsal compartment tendons just distal to extensor retinaculum. Patient tolerated this well without complication.    I discussed that the intended purpose of the injection is both diagnostic and therapeutic. I asked him to pay close attention to his symptoms and even temporary relief would be important to note. He should continue using his brace. If his symptoms persist/return we will consider an ultrasound of his extensor tendons to evaluate the extent of the tenosynovitis. May also consider an ultrasound to re-evaluate the carpal tunnel. He may be a candidate for serial injections vs possible extensor tendon debridement and partial distal resection of the extensor retinaculum. All questions answered. He voiced understanding and agreement.    Venkat Walsh MD    Hand, Upper Extremity & Microvascular Surgery  Department of Orthopedic Surgery  [facility]

## 2023-11-20 NOTE — LETTER
[facility] [address] [address]  [phone]          November 20, 2023    RE:  Scott Fernandez                                                                                                                                                       [id number] [facility] [address]            To whom it may concern:    Scott Fernandez is under my professional care for his right wrist.He  may return to work with the following: The employee is UNABLE to return to work until 11/27/23.      Sincerely,        Venkat Walsh MD

## 2023-11-20 NOTE — PROGRESS NOTES
Date of Service: Nov 20, 2023    Chief Complaint:   Chief Complaint   Patient presents with    RECHECK     6 week follow-up Right wrist pain,   extensor tenosynovitis     Interval events: Scott Fernandez is a 53 year old male who presents today in follow-up.  He has been wearing Exos splint full-time except for hygiene for the past 6 weeks.  He completed a course of oral steroids and has been taking meloxicam once daily.  He has also been off work since he was last seen.  Patient states that his swelling has improved quite a bit.  Pain is focal over the dorsal wrist and mostly unchanged.  He has pain with wrist range of motion.  Numbness and tingling to the hand is intermittent, mildly improved..  It mostly affects the thumb index ring and small finger.    Patient would like to try go back to work next Monday.    And has a rheumatology visit scheduled for May 2024.    The past medical history was reviewed updated in the EMR. This includes medications, surgeries, social history, and review of systems.    Physical examination:  Well-developed, well-nourished and in no acute distress.  Alert and oriented to surroundings.  Musculoskeletal: A focused physical examination of the right upper extremity reveals:   Inspection- minimal edema to the right hand, significantly improved from prior.  Skin is clean dry and intact.  No deformity.  Otherwise skin is normal appearing.  Normal hair distribution.  Palpation - Tender to palpation diffusely throughout the dorsal radiocarpal joint and along the extensor tendons. Extensor synovitis.   Neurovascular- Decreased sensation to light tough throughout the median, radial, and ulnar nerve distributions. Positive Tinel's at the carpal tunnel.  Positive carpal tunnel compression test.  Brisk capillary refill to the distal fingers. 2+ radial pulse.   Range of Motion: ROM of the wrist limited secondary to pain. Wrist extension to 50 degrees, flexion to 5 degrees.  Able to make a  full fist  Stability: Sterling difficult to assess given patient's pain, but feels relatively stable.  No palpable gross instability.   Muscle strength and tone: No atrophy, spasticity or flaccidness. 5/5 strength EPL, FPL, APB, first dorsal interosseous.     Special Tests:     Nerve: two point discrimination:  >10 mm in median and ulnar nerve distributions (5 mm in median and 6 mm in ulnar to the left hand).      Median nerve  Thenar atrophy: none  Carpal tunnel compression test: Positive  Phalen's test: negative  Tinel's test: Positive     Ulnar nerve  First dorsal interosseous atrophy:  not present   Froment's sign: negative  Cubital tunnel compression with elbow flexion: negative  Tinel's at the elbow: negative  Nerve subluxation with elbow ROM: Not present.      Tendon: FDS and FDP intact to all fingers. Normal tenodesis effect.      Sterling's test: unable to tolerate secondary to diffuse wrist pain.       Radiographs:   3 view X-ray of the right wrist with bilateral clenched fist view  from 9/22/23 was independently interpreted by me. The results were discussed with the patient.  Findings include: Widening of the right SL interval measuring approximately 4.2 mm. No other acute osseous abnormalities.  No significant degenerative changes of evidence of SLAC wrist.     MRI of the right wrist from 9/13/23 demonstrates widening of the SL interval and thickening of the dorsal scapholunate ligament.  No evidence of full-thickness scapholunate tears.  Diffuse soft tissue edema throughout the dorsal hand and wrist.  Tenosynovitis to the fourth extensor compartment.    EMG/NCS: EMG obtained on 10/4/2023 at [facility] demonstrates no evidence of peripheral nerve compression (carpal tunnel or cubital tunnel) plexopathy or cervical radiculopathy.  Chronic changes to the right APB nonspecific and could be due to prior carpal tunnel syndrome and surgical release.    Assessment: 53 year old male with   right wrist pain.  Normal EMG. Radiographs and MRI demonstrate scapholunate interval widening without full-thickness tear consistent with a history of a scapholunate partial tear or injury, however do not feel that is a sole contributor to his wrist pain.  MRI with diffuse dorsal wrist soft tissue swelling, suspect pain likely due to extensor tenosynovitis.    Plan:   -Discussed option of corticosteroid injection into the fourth dorsal compartment.  Patient agreeable.  This was performed by Dr. Hebert Walsh.  See separate documentation Dr. Walsh.  Discussed that he may need a series of injections for relief.  Patient will contact us if he gets any improvement, even short-term from the steroid injection.  We discussed that this injection may be therapeutic and diagnostic.  Should we be able to locate his symptoms to the extensor tenosynovitis could consider a extensor tendon debridement in the future.  -We also discussed that his numbness and tingling could be median nerve damage from his prior to carpal tunnel releases.  We could consider a nerve ultrasound.  We also discussed the possibility of an extensor tendon ultrasound in a few weeks if symptoms persist.  - Continue with Exos splint while at work.  Okay to return to work next Monday, 11/27/2023.  Work note provided  - Encouraged gentle regular finger range of motion to prevent stiffness.  - Meloxicam once daily in the morning with food.  - Recommend rheumatology consult, currently scheduled for May 2024.  - Follow-up in 6 weeks for recheck.     Plan discussed with Dr. Hebert Walsh who is in agreement.    Total time spent on date of encounter separate from Dr. Venkat Walsh and procedure time: 25 minutes.   Activities performed: Chart review: , Face to face counseling, and Coordination of care, and documentation.       WANDA SARGENT PA-C  Orthopaedic Surgery

## 2023-11-20 NOTE — PROGRESS NOTES
Hand / Upper Extremity Injection/Arthrocentesis    Date/Time: 2023 11:41 AM    Performed by: Venkat Walsh MD  Authorized by: Venkat Walsh MD    Indications:  Pain  Needle Size:  25 G  Guidance: landmark     Condition comment:  Right 4th extensor compartment     Medications:  4 mg dexAMETHasone 4 MG/ML; 1 mL lidocaine (PF) 1 %  Outcome:  Tolerated well, no immediate complications  Procedure discussed: discussed risks, benefits, and alternatives    Consent Given by:  Patient  Timeout: timeout called immediately prior to procedure    Prep: patient was prepped and draped in usual sterile fashion      [facility] [address]  [address] [facility] [id number] [phone]  Dept: [phone]  ______________________________________________________________________________    Patient: Scott Fernandez   : 1970   MRN: [id number]   2023    INVASIVE PROCEDURE SAFETY CHECKLIST    Date: 2023   Procedure:Right 4th extensor compartment steroid injection  Patient Name: Scott Fernandez  MRN: [id number]  YOB: 1970    Action: Complete sections as appropriate. Any discrepancy results in a HARD COPY until resolved.     PRE PROCEDURE:  Patient ID verified with 2 identifiers (name and  or MRN): Yes  Procedure and site verified with patient/designee (when able): Yes  Accurate consent documentation in medical record: Yes  H&P (or appropriate assessment) documented in medical record: Yes  H&P must be up to 20 days prior to procedure and updates within 24 hours of procedure as applicable: Yes  Relevant diagnostic and radiology test results appropriately labeled and displayed as applicable: NA  Procedure site(s) marked with provider initials: NA    TIMEOUT:  Time-Out performed immediately prior to starting procedure, including verbal and active participation of all team members addressing the following:Yes  * Correct patient  identify  * Confirmed that the correct side and site are marked  * An accurate procedure consent form  * Agreement on the procedure to be done  * Correct patient position  * Relevant images and results are properly labeled and appropriately displayed  * The need to administer antibiotics or fluids for irrigation purposes during the procedure as applicable   * Safety precautions based on patient history or medication use    DURING PROCEDURE: Verification of correct person, site, and procedures any time the responsibility for care of the patient is transferred to another member of the care team.       The following medications were given:         Prior to injection, verified patient identity using patient's name and date of birth.  Due to injection administration, patient instructed to remain in clinic for 15 minutes  afterwards, and to report any adverse reaction to me immediately.    Tendon sheath injection was performed.   Medication Name: Dexamethasone Sodium Phosphate NDC 72501-213-73  Drug Amount Wasted:  None.  Vial/Syringe: Multi dose vial  Expiration Date:  7/1/24    Medication Name Lidocaine 1% NDC 33554-241-80    Scribed by CEDRICK BENNETT, ATC for Dr. Walsh on November 20, 2023 at 11:45a based on the provider's statements to me.     CEDRICK BENNETT, ATC

## 2023-11-20 NOTE — NURSING NOTE
Reason For Visit:   Chief Complaint   Patient presents with    RECHECK     6 week follow-up Right wrist pain,   extensor tenosynovitis       Primary MD: Fernando Mcfarlane  Ref. MD: Adelfo    Age: 53 year old    ?  No      There were no vitals taken for this visit.      Pain Assessment  Patient Currently in Pain: Yes  0-10 Pain Scale: 8  Primary Pain Location: Wrist (Right)  Pain Descriptors: Constant    Hand Dominance Evaluation  Hand Dominance: Right          QuickDASH Assessment      11/13/2023     9:01 AM   QuickDASH Main   1. Open a tight or new jar Severe difficulty   2. Do heavy household chores (e.g., wash walls, floors) Mild difficulty   3. Carry a shopping bag or briefcase Mild difficulty   4. Wash your back Severe difficulty   5. Use a knife to cut food Mild difficulty   6. Recreational activities in which you take some force or impact through your arm, shoulder or hand (e.g., golf, hammering, tennis, etc.) Severe difficulty   7. During the past week, to what extent has your arm, shoulder or hand problem interfered with your normal social activities with family, friends, neighbours or groups Quite a bit   8. During the past week, were you limited in your work or other regular daily activities as a result of your arm, shoulder or hand problem Very limited   9. Arm, shoulder or hand pain Moderate   10.Tingling (pins and needles) in your arm,shoulder or hand Mild   11. During the past week, how much difficulty have you had sleeping because of the pain in your arm, shoulder or hand Mild difficulty   Quickdash Ability Score 50          Current Outpatient Medications   Medication Sig Dispense Refill    meloxicam (MOBIC) 7.5 MG tablet Take 1 tablet (7.5 mg) by mouth daily for 90 days 90 tablet 0    methylPREDNISolone (MEDROL DOSEPAK) 4 MG tablet therapy pack Follow Package Directions 21 tablet 0       Allergies   Allergen Reactions    Nkda [No Known Drug Allergy]        CEDRICK BENNETT, ATC

## 2023-11-28 ENCOUNTER — DOCUMENTATION ONLY (OUTPATIENT)
Dept: ORTHOPEDICS | Facility: CLINIC | Age: 53
End: 2023-11-28
Payer: COMMERCIAL

## 2023-11-28 NOTE — PROGRESS NOTES
"Nahomi DIAL   Pediatric Hospitalist  Pager: [phone]  5:10 PM 11/21/18     " Received Completed forms Yes   Faxed Forms Faxed To: the [facility]  Fax Number: [phone]   Sent to HIM (Date) 11/28/23

## 2023-12-01 ENCOUNTER — TELEPHONE (OUTPATIENT)
Dept: ORTHOPEDICS | Facility: CLINIC | Age: 53
End: 2023-12-01

## 2023-12-01 DIAGNOSIS — M65.931 EXTENSOR TENOSYNOVITIS OF RIGHT WRIST: Primary | ICD-10-CM

## 2023-12-01 NOTE — TELEPHONE ENCOUNTER
Patient is asking for a reply to his [employer] message sent to Migdalia Alcantara on 11/28 regarding his continued issues with pain after his injection on 11/20/23. Patient does prefer [employer], but he can also be reached at [phone], okay to leave detailed msg.

## 2023-12-05 ENCOUNTER — ANCILLARY PROCEDURE (OUTPATIENT)
Dept: ULTRASOUND IMAGING | Facility: CLINIC | Age: 53
End: 2023-12-05
Attending: PHYSICIAN ASSISTANT
Payer: COMMERCIAL

## 2023-12-05 DIAGNOSIS — M65.931 EXTENSOR TENOSYNOVITIS OF RIGHT WRIST: ICD-10-CM

## 2023-12-05 PROCEDURE — 76881 US COMPL JOINT R-T W/IMG: CPT | Mod: GC | Performed by: RADIOLOGY

## 2023-12-11 ENCOUNTER — OFFICE VISIT (OUTPATIENT)
Dept: ORTHOPEDICS | Facility: CLINIC | Age: 53
End: 2023-12-11
Payer: COMMERCIAL

## 2023-12-11 DIAGNOSIS — M65.931 EXTENSOR TENOSYNOVITIS OF RIGHT WRIST: Primary | ICD-10-CM

## 2023-12-11 PROCEDURE — 99214 OFFICE O/P EST MOD 30 MIN: CPT | Performed by: STUDENT IN AN ORGANIZED HEALTH CARE EDUCATION/TRAINING PROGRAM

## 2023-12-11 NOTE — NURSING NOTE
Reason For Visit:   Chief Complaint   Patient presents with    RECHECK     Review wrist US and discuss treatment plan        Primary MD: Fernando Mcfarlane  Ref. MD: saul    Age: 53 year old    ?  No      There were no vitals taken for this visit.      Pain Assessment  Patient Currently in Pain: Yes  0-10 Pain Scale: 7  Primary Pain Location: Wrist (right)               QuickDASH Assessment      12/4/2023    10:43 AM   QuickDASH Main   1. Open a tight or new jar Severe difficulty   2. Do heavy household chores (e.g., wash walls, floors) Severe difficulty   3. Carry a shopping bag or briefcase Severe difficulty   4. Wash your back Severe difficulty   5. Use a knife to cut food Moderate difficulty   6. Recreational activities in which you take some force or impact through your arm, shoulder or hand (e.g., golf, hammering, tennis, etc.) Severe difficulty   7. During the past week, to what extent has your arm, shoulder or hand problem interfered with your normal social activities with family, friends, neighbours or groups Extremely   8. During the past week, were you limited in your work or other regular daily activities as a result of your arm, shoulder or hand problem Very limited   9. Arm, shoulder or hand pain Severe   10.Tingling (pins and needles) in your arm,shoulder or hand Mild   11. During the past week, how much difficulty have you had sleeping because of the pain in your arm, shoulder or hand Moderate difficulty   Quickdash Ability Score 68.18          Current Outpatient Medications   Medication Sig Dispense Refill    meloxicam (MOBIC) 7.5 MG tablet Take 1 tablet (7.5 mg) by mouth daily for 90 days 90 tablet 0    methylPREDNISolone (MEDROL DOSEPAK) 4 MG tablet therapy pack Follow Package Directions 21 tablet 0       Allergies   Allergen Reactions    Nkda [No Known Drug Allergy]        Vishnu Burgos, ATC

## 2023-12-11 NOTE — PROGRESS NOTES
DME FITTING    Relevant Diagnosis: Right wrist pain  Exos, FX brace, short arm, open thumb, RT, LG brace was fit on patient's Right wrist.     Person(s) involved in teaching:   Patient and Wife    Brace was applied in standard Manner:  Yes  Brace fit well:  Yes  Patient reports brace to fit comfortably:  Yes    Education:   Patient shown self application and removal of brace: Yes  Patient shown how to adjust brace fit, if necessary: Yes  Patient educated on billing and return policy: Yes  Patient confirmed understanding when and how to contact clinic with concerns: Yes

## 2023-12-11 NOTE — LETTER
12/11/2023         RE: Scott Fernandez  [address] [facility] [address]        Dear Colleague,    Thank you for referring your patient, Scott Fernandez, to the [facility] [place]. Please see a copy of my visit note below.    DME FITTING    Relevant Diagnosis: Right wrist pain  Exos, FX brace, short arm, open thumb, RT, LG brace was fit on patient's Right wrist.     Person(s) involved in teaching:   Patient and Wife    Brace was applied in standard Manner:  Yes  Brace fit well:  Yes  Patient reports brace to fit comfortably:  Yes    Education:   Patient shown self application and removal of brace: Yes  Patient shown how to adjust brace fit, if necessary: Yes  Patient educated on billing and return policy: Yes  Patient confirmed understanding when and how to contact clinic with concerns: Yes      Date of Service: Dec 11, 2023    Chief Complaint:   Chief Complaint   Patient presents with    RECHECK     Review wrist US and discuss treatment plan      Interval events: Scott Fernandez is a 53 year old male who presents today in follow-up.  He continues to wear Exos splint full-time.  It has broken and he is reinforcing with tape. He completed a course of oral steroids and has been taking meloxicam once daily. I provided him an injection at last visit which provided no relief. He thinks it made the burning/irritation over the dorsal skin worse. Pain is focal over the dorsal wrist and mostly unchanged.  He has pain with wrist range of motion, worse with extension. He had his ultrasound completed and is here to discuss results.    He has a rheumatology visit scheduled for May 2024.    The past medical history was reviewed updated in the EMR. This includes medications, surgeries, social history, and review of systems.    Physical examination:  Well-developed, well-nourished and in no acute distress.  Alert and oriented to surroundings.  Musculoskeletal: A focused  physical examination of the right upper extremity reveals:   Inspection- minimal edema to the right hand, significantly improved from prior.  Skin is clean dry and intact.  No deformity.  Otherwise skin is normal appearing.  Normal hair distribution.  Palpation - Tender to palpation diffusely throughout the dorsal radiocarpal joint and along the extensor tendons. Extensor tenosynovitis.   Neurovascular- Decreased sensation to light tough throughout the median, radial, and ulnar nerve distributions. Positive Tinel's at the carpal tunnel.  Positive carpal tunnel compression test.  Brisk capillary refill to the distal fingers. 2+ radial pulse.   Range of Motion: ROM of the wrist limited secondary to pain. Wrist extension to 50 degrees, flexion to 5 degrees. Pain worse with extension Able to make a full fist  Stability: Sterling shift negative today. No palpable gross instability.   Muscle strength and tone: No atrophy, spasticity or flaccidness. 5/5 strength EPL, FPL, APB, first dorsal interosseous.     Special Tests:     Nerve: two point discrimination:  >10 mm in median and ulnar nerve distributions (5 mm in median and 6 mm in ulnar to the left hand).      Median nerve  Thenar atrophy: none  Carpal tunnel compression test: Positive  Phalen's test: negative  Tinel's test: Positive     Ulnar nerve  First dorsal interosseous atrophy:  not present   Froment's sign: negative  Cubital tunnel compression with elbow flexion: negative  Tinel's at the elbow: negative  Nerve subluxation with elbow ROM: Not present.      Tendon: FDS and FDP intact to all fingers. Normal tenodesis effect.       Radiographs:   3 view X-ray of the right wrist with bilateral clenched fist view  from 9/22/23 was independently interpreted by me. The results were discussed with the patient.  Findings include: Widening of the right SL interval measuring approximately 4.2 mm. No other acute osseous abnormalities.  No significant degenerative changes of  evidence of SLAC wrist.     MRI of the right wrist from 9/13/23 demonstrates widening of the SL interval and thickening of the dorsal scapholunate ligament.  No evidence of full-thickness scapholunate tears.  Diffuse soft tissue edema throughout the dorsal hand and wrist.  Tenosynovitis to the fourth extensor compartment.    EMG/NCS: EMG obtained on 10/4/2023 at [facility] demonstrates no evidence of peripheral nerve compression (carpal tunnel or cubital tunnel) plexopathy or cervical radiculopathy.  Chronic changes to the right APB nonspecific and could be due to prior carpal tunnel syndrome and surgical release.    Ultrasound 12/5/23  Impression:   Diffuse subcutaneous edema overlying the dorsum of the wrist with mild  tenosynovitis of extensor compartments 3 and 4. Overall the findings  have likely not significantly changed from the comparison MRI on  9/13/2023 when allowing for differences in imaging modality.       Assessment: 53 year old male with  right wrist pain most consistent with extensor tendinosis and tenosynovitis, most affecting the 4th dorsal compartment. Possibly due to impingement on the extensor retinaculum.    Symptoms refractory to all conservative management, including oral steroids, injections, bracing.    Patient is most symptomatic over the dorsal wrist over 4th dorsal compartment tendons just distal to extensor retinaculum. He states symptoms are aggravated by repetitive wrist ROM, and extension worse than flexion. MRI demonstrates significant tenosynovitis of the 4th dorsal compartment tendons. He does have some  SL diastasis and thickening of the dorsal and volar SL ligament but there is no evidence of instability on exam. I do not think this is the main cause of his symptoms.     Normal EMG, previously had a carpal tunnel release.    At this point, I advised that the last intervention I can offer is surgery in the form of an extensor tendon debridement. We will  release his extensor retinaculum and re-approximate loosely after the debridement with partial distal resection of the retinaculum. We will also transpose his EPL.    The indications for surgery were discussed with the patient. The benefits, risks, and alternatives of operative management were discussed in detail with the patient. The patient understands that the risks of surgery include, but are not limited to: infection, bleeding, injury to nearby structures (such as nerves, blood vessels, and tendons), need for additional surgery, pain, stiffness, scarring, need for rehabilitation, and anesthetic complications. I advised that my main concern is that he will obtain incomplete relief given that injections were neither diagnostic or therapeutic. Patient expressed understanding and elected to proceed with surgery. All questions were answered to the patient's satisfaction.    Case request placed. [facility]    Venkat Walsh MD    Hand & Upper Extremity Surgery  Department of Orthopedic Surgery  [facility]

## 2023-12-11 NOTE — NURSING NOTE
Teaching Flowsheet   Relevant Diagnosis: Extensor tenosynovitis of right wrist  Teaching Topic: Extensor tendon debridement of right wrist.    CSC under MAC with Block with Dr Venkat Walsh  BMI 33.47    Person(s) involved in teaching:   Patient and Wife     Motivation Level:  Asks Questions: Yes  Eager to Learn: Yes  Cooperative: Yes  Receptive (willing/able to accept information): Yes  Any cultural factors/Adventism beliefs that may influence understanding or compliance? No    Patient demonstrates understanding of the following:  Reason for the appointment, diagnosis and treatment plan: Yes  Knowledge of proper use of medications and conditions for which they are ordered (with special attention to potential side effects or drug interactions): Yes  Which situations necessitate calling provider and whom to contact: Yes    Teaching Concerns Addressed:   Proper use and care of  (medical equip, care aids, etc.): Yes  Nutritional needs and diet plan: Yes  Pain management techniques: Yes  Wound Care: Yes  How and/when to access community resources: Yes     Instructional Materials Used/Given: Preoperative surgery packet, antibacterial Chlorhexidine soap. Stop Light Tool reviewed, after-hours number provided, patient verbalized understanding, had no immediate questions. Jessie Booht RN

## 2023-12-12 ENCOUNTER — TELEPHONE (OUTPATIENT)
Dept: ORTHOPEDICS | Facility: CLINIC | Age: 53
End: 2023-12-12

## 2023-12-12 NOTE — PROGRESS NOTES
Date of Service: Dec 11, 2023    Chief Complaint:   Chief Complaint   Patient presents with     RECHECK     Review wrist US and discuss treatment plan      Interval events: Scott Fernandez is a 53 year old male who presents today in follow-up.  He continues to wear Exos splint full-time.  It has broken and he is reinforcing with tape. He completed a course of oral steroids and has been taking meloxicam once daily. I provided him an injection at last visit which provided no relief. He thinks it made the burning/irritation over the dorsal skin worse. Pain is focal over the dorsal wrist and mostly unchanged.  He has pain with wrist range of motion, worse with extension. He had his ultrasound completed and is here to discuss results.    He has a rheumatology visit scheduled for May 2024.    The past medical history was reviewed updated in the EMR. This includes medications, surgeries, social history, and review of systems.    Physical examination:  Well-developed, well-nourished and in no acute distress.  Alert and oriented to surroundings.  Musculoskeletal: A focused physical examination of the right upper extremity reveals:   Inspection- minimal edema to the right hand, significantly improved from prior.  Skin is clean dry and intact.  No deformity.  Otherwise skin is normal appearing.  Normal hair distribution.  Palpation - Tender to palpation diffusely throughout the dorsal radiocarpal joint and along the extensor tendons. Extensor tenosynovitis.   Neurovascular- Decreased sensation to light tough throughout the median, radial, and ulnar nerve distributions. Positive Tinel's at the carpal tunnel.  Positive carpal tunnel compression test.  Brisk capillary refill to the distal fingers. 2+ radial pulse.   Range of Motion: ROM of the wrist limited secondary to pain. Wrist extension to 50 degrees, flexion to 5 degrees. Pain worse with extension Able to make a full fist  Stability: Sterling shift negative today. No  palpable gross instability.   Muscle strength and tone: No atrophy, spasticity or flaccidness. 5/5 strength EPL, FPL, APB, first dorsal interosseous.     Special Tests:     Nerve: two point discrimination:  >10 mm in median and ulnar nerve distributions (5 mm in median and 6 mm in ulnar to the left hand).      Median nerve  Thenar atrophy: none  Carpal tunnel compression test: Positive  Phalen's test: negative  Tinel's test: Positive     Ulnar nerve  First dorsal interosseous atrophy:  not present   Froment's sign: negative  Cubital tunnel compression with elbow flexion: negative  Tinel's at the elbow: negative  Nerve subluxation with elbow ROM: Not present.      Tendon: FDS and FDP intact to all fingers. Normal tenodesis effect.       Radiographs:   3 view X-ray of the right wrist with bilateral clenched fist view  from 9/22/23 was independently interpreted by me. The results were discussed with the patient.  Findings include: Widening of the right SL interval measuring approximately 4.2 mm. No other acute osseous abnormalities.  No significant degenerative changes of evidence of SLAC wrist.     MRI of the right wrist from 9/13/23 demonstrates widening of the SL interval and thickening of the dorsal scapholunate ligament.  No evidence of full-thickness scapholunate tears.  Diffuse soft tissue edema throughout the dorsal hand and wrist.  Tenosynovitis to the fourth extensor compartment.    EMG/NCS: EMG obtained on 10/4/2023 at [facility] demonstrates no evidence of peripheral nerve compression (carpal tunnel or cubital tunnel) plexopathy or cervical radiculopathy.  Chronic changes to the right APB nonspecific and could be due to prior carpal tunnel syndrome and surgical release.    Ultrasound 12/5/23  Impression:   Diffuse subcutaneous edema overlying the dorsum of the wrist with mild  tenosynovitis of extensor compartments 3 and 4. Overall the findings  have likely not significantly  changed from the comparison MRI on  9/13/2023 when allowing for differences in imaging modality.       Assessment: 53 year old male with  right wrist pain most consistent with extensor tendinosis and tenosynovitis, most affecting the 4th dorsal compartment. Possibly due to impingement on the extensor retinaculum.    Symptoms refractory to all conservative management, including oral steroids, injections, bracing.    Patient is most symptomatic over the dorsal wrist over 4th dorsal compartment tendons just distal to extensor retinaculum. He states symptoms are aggravated by repetitive wrist ROM, and extension worse than flexion. MRI demonstrates significant tenosynovitis of the 4th dorsal compartment tendons. He does have some  SL diastasis and thickening of the dorsal and volar SL ligament but there is no evidence of instability on exam. I do not think this is the main cause of his symptoms.     Normal EMG, previously had a carpal tunnel release.    At this point, I advised that the last intervention I can offer is surgery in the form of an extensor tendon debridement. We will release his extensor retinaculum and re-approximate loosely after the debridement with partial distal resection of the retinaculum. We will also transpose his EPL.    The indications for surgery were discussed with the patient. The benefits, risks, and alternatives of operative management were discussed in detail with the patient. The patient understands that the risks of surgery include, but are not limited to: infection, bleeding, injury to nearby structures (such as nerves, blood vessels, and tendons), need for additional surgery, pain, stiffness, scarring, need for rehabilitation, and anesthetic complications. I advised that my main concern is that he will obtain incomplete relief given that injections were neither diagnostic or therapeutic. Patient expressed understanding and elected to proceed with surgery. All questions were answered to  the patient's satisfaction.    Case request placed. [facility] [place]    Venkat Walsh MD    Hand & Upper Extremity Surgery  Department of Orthopedic Surgery  [facility]

## 2023-12-12 NOTE — TELEPHONE ENCOUNTER
Patient has been scheduled for surgery. Details are below.    Date of Surgery: 02/16/24    Approximate Arrival Time: SURGERY CENTER WILL CALL 3/4 DAYS PRIOR TO CONFIRM A TIME   Surgeon:  DR. CARLENE GREEN     Procedure: EXTENSOR TENDON DEBRIDEMENT RIGHT WRIST - Right   Location: [facility] [address]  Surgery Consult: NA  PreOp Physical: WILL CALL PCP  PostOp: 02/23/24 & HT  Packet Mailed/MyChart Sent: YES  Added to [facility]: YES

## 2023-12-18 ENCOUNTER — MYC MEDICAL ADVICE (OUTPATIENT)
Dept: ORTHOPEDICS | Facility: CLINIC | Age: 53
End: 2023-12-18
Payer: COMMERCIAL

## 2023-12-18 DIAGNOSIS — R20.0 NUMBNESS OF FINGERS: ICD-10-CM

## 2023-12-18 DIAGNOSIS — M25.531 RIGHT WRIST PAIN: Primary | ICD-10-CM

## 2024-01-05 ENCOUNTER — MYC MEDICAL ADVICE (OUTPATIENT)
Dept: ORTHOPEDICS | Facility: CLINIC | Age: 54
End: 2024-01-05

## 2024-01-05 ENCOUNTER — ANCILLARY PROCEDURE (OUTPATIENT)
Dept: ULTRASOUND IMAGING | Facility: CLINIC | Age: 54
End: 2024-01-05
Attending: STUDENT IN AN ORGANIZED HEALTH CARE EDUCATION/TRAINING PROGRAM
Payer: COMMERCIAL

## 2024-01-05 DIAGNOSIS — M25.531 RIGHT WRIST PAIN: ICD-10-CM

## 2024-01-05 DIAGNOSIS — R20.0 NUMBNESS OF FINGERS: ICD-10-CM

## 2024-01-05 PROCEDURE — 76882 US LMTD JT/FCL EVL NVASC XTR: CPT | Performed by: RADIOLOGY

## 2024-01-11 DIAGNOSIS — Z98.890 HISTORY OF CARPAL TUNNEL RELEASE OF BOTH WRISTS: ICD-10-CM

## 2024-01-11 DIAGNOSIS — M65.931 EXTENSOR TENOSYNOVITIS OF RIGHT WRIST: ICD-10-CM

## 2024-01-11 DIAGNOSIS — R20.0 NUMBNESS OF FINGERS: ICD-10-CM

## 2024-01-11 DIAGNOSIS — M25.531 RIGHT WRIST PAIN: Primary | ICD-10-CM

## 2024-01-11 NOTE — TELEPHONE ENCOUNTER
Called patient this AM to discuss ultrasound results.    Results show scarring at the carpal tunnel with flattening of the nerve but no definite evidence of carpal tunnel syndrome. Also no definite evidence of cubital tunnel syndrome on EMG/NCS or ultrasound.    We again discussed the patient's symptoms.  The patient describes numbness and tingling predominantly in the ring and small fingers.  This is largely positional and is aggravated by elbow flexion.  He does wake up at times at night with numbness and tingling in the ring and small finger as well as the thumb.  The patient has had carpal tunnel releases years ago.  Prior physical exam demonstrated provocative signs at at the carpal tunnel.    I discussed treatment options with the patient, including continued bracing at night, which has not been effective for the numbness and tingling.  I also discussed sleeping with elbow straight.  We also discussed surgery in the form of a cubital tunnel release as well as a revision carpal tunnel release.    Given that the patient is having surgery for his extensor tenosynovitis, he wishes to proceed with releases of his ulnar nerve in his median nerve.  His symptoms have been ongoing for a long time, and are consistent with both diagnoses so I do think this is reasonable.  I cautioned that the main risk is that he does not get better after the nerve releases.    The indications for surgery were discussed with the patient. The benefits, risks, and alternatives of operative management were discussed in detail with the patient. The patient understands that the risks of surgery include, but are not limited to: infection, bleeding, injury to nearby structures (such as nerves, blood vessels, and tendons), wound healing problems, temporary weakness in , persistent symptoms, need for additional surgery, pain, stiffness, scarring, need for rehabilitation, and anesthetic complications.  Patient expressed understanding and  elected to proceed with surgery. All questions were answered to the patient's satisfaction.    New case request placed for extensor tendon debridement, carpal tunnel release, and cubital tunnel release.    Venkat Walsh MD    Hand & Upper Extremity Surgery  Department of Orthopedic Surgery  [facility]

## 2024-01-15 ENCOUNTER — OFFICE VISIT (OUTPATIENT)
Dept: FAMILY MEDICINE | Facility: CLINIC | Age: 54
End: 2024-01-15
Payer: COMMERCIAL

## 2024-01-15 VITALS
SYSTOLIC BLOOD PRESSURE: 124 MMHG | RESPIRATION RATE: 18 BRPM | OXYGEN SATURATION: 98 % | DIASTOLIC BLOOD PRESSURE: 78 MMHG | TEMPERATURE: 97.4 F | WEIGHT: 239.2 LBS | HEIGHT: 71 IN | HEART RATE: 101 BPM | BODY MASS INDEX: 33.49 KG/M2

## 2024-01-15 DIAGNOSIS — M25.531 RIGHT WRIST PAIN: ICD-10-CM

## 2024-01-15 DIAGNOSIS — G56.01 CARPAL TUNNEL SYNDROME OF RIGHT WRIST: ICD-10-CM

## 2024-01-15 DIAGNOSIS — Z01.818 PREOP GENERAL PHYSICAL EXAM: Primary | ICD-10-CM

## 2024-01-15 PROCEDURE — 99214 OFFICE O/P EST MOD 30 MIN: CPT | Performed by: PHYSICIAN ASSISTANT

## 2024-01-15 ASSESSMENT — PAIN SCALES - GENERAL: PAINLEVEL: NO PAIN (0)

## 2024-01-15 NOTE — PATIENT INSTRUCTIONS
Lisa Chavez,    Thank you for allowing [facility] to manage your care.    How to Take Your Medication Before Surgery  - HOLD (do not take) ibuprofen, aspirin, naproxen or other NSAIDs between now and your surgery. For your pain, please use Tylenol 650mg every 6 hours as needed. Max acetaminophen (Tylenol) 4,000mg/24 hours    If you have any questions or concerns, please feel free to call us at [phone]    Sincerely,    Cameron Simon PA-C    Did you know?      You can schedule a video visit for follow-up appointments as well as future appointments for certain conditions.  Please see the below link.     [email or web address]    If you have not already done so,  I encourage you to sign up for [employer] ([email or web address]).  This will allow you to review your results, securely communicate with a provider, and schedule virtual visits as well.    Preparing for Your Surgery  Getting started  A nurse will call you to review your health history and instructions. They will give you an arrival time based on your scheduled surgery time. Please be ready to share:  Your doctor's clinic name and phone number  Your medical, surgical, and anesthesia history  A list of allergies and sensitivities  A list of medicines, including herbal treatments and over-the-counter drugs  Whether the patient has a legal guardian (ask how to send us the papers in advance)  Please tell us if you're pregnant--or if there's any chance you might be pregnant. Some surgeries may injure a fetus (unborn baby), so they require a pregnancy test. Surgeries that are safe for a fetus don't always need a test, and you can choose whether to have one.   If you have a child who's having surgery, please ask for a copy of Preparing for Your Child's Surgery.    Preparing for surgery  Within 10 to 30 days of surgery: Have a pre-op exam (sometimes called an H&P, or History and Physical). This can be done at  a clinic or pre-operative center.  If you're having a , you may not need this exam. Talk to your care team.  At your pre-op exam, talk to your care team about all medicines you take. If you need to stop any medicines before surgery, ask when to start taking them again.  We do this for your safety. Many medicines can make you bleed too much during surgery. Some change how well surgery (anesthesia) drugs work.  Call your insurance company to let them know you're having surgery. (If you don't have insurance, call [phone].)  Call your clinic if there's any change in your health. This includes signs of a cold or flu (sore throat, runny nose, cough, rash, fever). It also includes a scrape or scratch near the surgery site.  If you have questions on the day of surgery, call your hospital or surgery center.  Eating and drinking guidelines  For your safety: Unless your surgeon tells you otherwise, follow the guidelines below.  Eat and drink as usual until 8 hours before you arrive for surgery. After that, no food or milk.  Drink clear liquids until 2 hours before you arrive. These are liquids you can see through, like water, Gatorade, and Propel Water. They also include plain black coffee and tea (no cream or milk), candy, and breath mints. You can spit out gum when you arrive.  If you drink alcohol: Stop drinking it the night before surgery.  If your care team tells you to take medicine on the morning of surgery, it's okay to take it with a sip of water.  Preventing infection  Shower or bathe the night before and morning of your surgery. Follow the instructions your clinic gave you. (If no instructions, use regular soap.)  Don't shave or clip hair near your surgery site. We'll remove the hair if needed.  Don't smoke or vape the morning of surgery. You may chew nicotine gum up to 2 hours before surgery. A nicotine patch is okay.  Note: Some surgeries require you to completely quit smoking and nicotine. Check  with your surgeon.  Your care team will make every effort to keep you safe from infection. We will:  Clean our hands often with soap and water (or an alcohol-based hand rub).  Clean the skin at your surgery site with a special soap that kills germs.  Give you a special gown to keep you warm. (Cold raises the risk of infection.)  Wear special hair covers, masks, gowns and gloves during surgery.  Give antibiotic medicine, if prescribed. Not all surgeries need antibiotics.  What to bring on the day of surgery  Photo ID and insurance card  Copy of your health care directive, if you have one  Glasses and hearing aids (bring cases)  You can't wear contacts during surgery  Inhaler and eye drops, if you use them (tell us about these when you arrive)  CPAP machine or breathing device, if you use them  A few personal items, if spending the night  If you have . . .  A pacemaker, ICD (cardiac defibrillator) or other implant: Bring the ID card.  An implanted stimulator: Bring the remote control.  A legal guardian: Bring a copy of the certified (court-stamped) guardianship papers.  Please remove any jewelry, including body piercings. Leave jewelry and other valuables at home.  If you're going home the day of surgery  You must have a responsible adult drive you home. They should stay with you overnight as well.  If you don't have someone to stay with you, and you aren't safe to go home alone, we may keep you overnight. Insurance often won't pay for this.  After surgery  If it's hard to control your pain or you need more pain medicine, please call your surgeon's office.  Questions?   If you have any questions for your care team, list them here: _________________________________________________________________________________________________________________________________________________________________________ ____________________________________ ____________________________________ ____________________________________  For  informational purposes only. Not to replace the advice of your health care provider. Copyright   2003, 2019 [facility]. All rights reserved. Clinically reviewed by Dipti Benjamin MD. [employer] 689411 - REV 12/22.

## 2024-01-15 NOTE — PROGRESS NOTES
[facility]  [address]  [place] [address]  Phone: [phone]  Primary Provider: Fernando Mcfarlane  Pre-op Performing Provider: CHARLES SILVER    PREOPERATIVE EVALUATION:  Today's date: 1/15/2024    Scott is a 53 year old, presenting for the following:  Pre-Op Exam        1/15/2024     3:36 PM   Additional Questions   Roomed by Willian Rothman CMA   Accompanied by N/A         1/15/2024     3:36 PM   Patient Reported Additional Medications   Patient reports taking the following new medications No new medications       Surgical Information:  Surgery/Procedure: Extensor tendon debridement right wrist  Surgery Location: [facility]  Surgeon: Venkat Walsh MD  Surgery Date: 01/19/2024  Time of Surgery: 12:35 pm  Where patient plans to recover: At home with family  Fax number for surgical facility: Note does not need to be faxed, will be available electronically in Epic.    Assessment & Plan     The proposed surgical procedure is considered INTERMEDIATE risk.    Problem List Items Addressed This Visit          Nervous and Auditory    CTS (carpal tunnel syndrome)    Right wrist pain     Other Visit Diagnoses       Preop general physical exam    -  Primary           - No identified additional risk factors other than previously addressed    Antiplatelet or Anticoagulation Medication Instructions:   - Patient is on no antiplatelet or anticoagulation medications.    Additional Medication Instructions:  Patient is on no additional chronic medications    RECOMMENDATION:  APPROVAL GIVEN to proceed with proposed procedure, without further diagnostic evaluation.    Subjective       HPI related to upcoming procedure: Extensor tendon debridement, carpal tunnel and ulnar nerve release of right wrist,         1/12/2024    10:35 AM   Preop Questions   1. Have you ever had a heart attack or stroke? No   2. Have you ever had surgery on your heart or blood vessels, such as a stent  placement, a coronary artery bypass, or surgery on an artery in your head, neck, heart, or legs? No   3. Do you have chest pain with activity? No   4. Do you have a history of  heart failure? No   5. Do you currently have a cold, bronchitis or symptoms of other infection? No   6. Do you have a cough, shortness of breath, or wheezing? No   7. Do you or anyone in your family have previous history of blood clots? No   8. Do you or does anyone in your family have a serious bleeding problem such as prolonged bleeding following surgeries or cuts? No   9. Have you ever had problems with anemia or been told to take iron pills? No   10. Have you had any abnormal blood loss such as black, tarry or bloody stools? No   11. Have you ever had a blood transfusion? No   12. Are you willing to have a blood transfusion if it is medically needed before, during, or after your surgery? Yes   13. Have you or any of your relatives ever had problems with anesthesia? YES - Mom did   14. Do you have sleep apnea, excessive snoring or daytime drowsiness? No   15. Do you have any artifical heart valves or other implanted medical devices like a pacemaker, defibrillator, or continuous glucose monitor? No   16. Do you have artificial joints? No   17. Are you allergic to latex? No       Health Care Directive:  Patient does not have a Health Care Directive or Living Will: Patient states has Advance Directive and will bring in a copy to clinic.    Preoperative Review of :   reviewed - no record of controlled substances prescribed.    Status of Chronic Conditions:  See problem list for active medical problems.  Problems all longstanding and stable, except as noted/documented.  See ROS for pertinent symptoms related to these conditions.    Review of Systems  CONSTITUTIONAL: NEGATIVE for fever, chills, change in weight  INTEGUMENTARY/SKIN: NEGATIVE for worrisome rashes, moles or lesions  EYES: NEGATIVE for vision changes or irritation  ENT/MOUTH:  NEGATIVE for ear, mouth and throat problems  RESP: NEGATIVE for significant cough or SOB  CV: NEGATIVE for chest pain, palpitations or peripheral edema  GI: NEGATIVE for nausea, abdominal pain, heartburn, or change in bowel habits  : NEGATIVE for frequency, dysuria, or hematuria  MUSCULOSKELETAL: NEGATIVE for significant arthralgias or myalgia  NEURO: NEGATIVE for weakness, dizziness or paresthesias  ENDOCRINE: NEGATIVE for temperature intolerance, skin/hair changes  HEME: NEGATIVE for bleeding problems  PSYCHIATRIC: NEGATIVE for changes in mood or affect    Patient Active Problem List    Diagnosis Date Noted    Morbid obesity (H) 05/01/2017     Priority: High    Right wrist pain 01/11/2024     Priority: Medium    Numbness of fingers 01/11/2024     Priority: Medium    History of carpal tunnel release of both wrists 01/11/2024     Priority: Medium    Extensor tenosynovitis of right wrist 12/11/2023     Priority: Medium    Essential hypertension 01/09/2017     Priority: Medium    Proteinuria 01/21/2014     Priority: Medium    GERD (gastroesophageal reflux disease) 09/28/2012     Priority: Medium    Sleeping difficulty 08/29/2012     Priority: Medium     Due to shoulder pain, using ambien until rotator cuff repair is complete and hopefully pain improved.          CTS (carpal tunnel syndrome) 08/29/2012     Priority: Medium     2 surgeries on each wrist when younger.        HYPERLIPIDEMIA LDL GOAL <130 10/31/2010     Priority: Medium    Obesity 10/08/2008     Priority: Medium      Past Medical History:   Diagnosis Date    Esophageal reflux     GERD    High cholesterol     HTN (hypertension)     PONV (postoperative nausea and vomiting)      Past Surgical History:   Procedure Laterality Date    APPENDECTOMY  1990 approx    ARTHROSCOPY SHOULDER, OPEN ROTATOR CUFF REPAIR, COMBINED  7/22/2013    Procedure: COMBINED ARTHROSCOPY SHOULDER, OPEN ROTATOR CUFF REPAIR;  Right Shoulder Arthroscopic Mini-Open Rotator Cuff Repair;   "Surgeon: Ley, Jeffrey Duane, MD;  Location: [facility] OR    LAPAROSCOPIC CHOLECYSTECTOMY N/A 11/16/2016    Procedure: LAPAROSCOPIC CHOLECYSTECTOMY;  Surgeon: Reg Giang MD;  Location: [facility] OR    ORTHOPEDIC SURGERY  2009    Left Shoulder    SURGICAL HISTORY OF -   1996    shoulder left RCR    SURGICAL HISTORY OF -   1980's    Carpal tunnel procedure 2 on rt wrist and 1 on left wrist    SURGICAL HISTORY OF -   2008    shoulder left     Current Outpatient Medications   Medication Sig Dispense Refill    meloxicam (MOBIC) 7.5 MG tablet Take 1 tablet (7.5 mg) by mouth daily for 90 days 90 tablet 0       Allergies   Allergen Reactions    Nkda [No Known Drug Allergy]         Social History     Tobacco Use    Smoking status: Never     Passive exposure: Never    Smokeless tobacco: Never   Substance Use Topics    Alcohol use: No     Family History   Problem Relation Age of Onset    Cardiovascular Mother         CHF starting around 50-idiopathic cardiomyopathy    Lipids Father     Cardiovascular Father         Stents, in late 50's    C.A.D. Father     Alcohol/Drug Maternal Grandmother     Alcohol/Drug Maternal Grandfather      History   Drug Use No         Objective     /78   Pulse 101   Temp 97.4  F (36.3  C) (Temporal)   Resp 18   Ht 1.798 m (5' 10.79\")   Wt 108.5 kg (239 lb 3.2 oz)   SpO2 98%   BMI 33.56 kg/m      Physical Exam    GENERAL APPEARANCE: healthy, alert and no distress     HENT:  nose and mouth without ulcers or lesions     NECK: no adenopathy, no asymmetry, masses, or scars and thyroid normal to palpation     RESP: lungs clear to auscultation - no rales, rhonchi or wheezes     CV: regular rates and rhythm, normal S1 S2, no S3 or S4 and no murmur, click or rub     ABDOMEN:  soft, nontender, no HSM or masses and bowel sounds normal     MS: RUE in wrist splint. Otherwise, extremities normal- no gross deformities noted, no evidence of inflammation in joints, FROM in all extremities.     SKIN: no " "suspicious lesions or rashes     NEURO: Normal strength and tone, sensory exam grossly normal, mentation intact and speech normal     PSYCH: mentation appears normal. and affect normal/bright     LYMPHATICS: No cervical adenopathy    No results for input(s): \"HGB\", \"PLT\", \"INR\", \"NA\", \"POTASSIUM\", \"CR\", \"A1C\" in the last 72698 hours.     Diagnostics:  Labs pending at this time.  Results will be reviewed when available.   No EKG required, no history of coronary heart disease, significant arrhythmia, peripheral arterial disease or other structural heart disease.    Revised Cardiac Risk Index (RCRI):  The patient has the following serious cardiovascular risks for perioperative complications:   - No serious cardiac risks = 0 points     RCRI Interpretation: 0 points: Class I (very low risk - 0.4% complication rate)         Signed Electronically by: IRLANDA Pagan  Copy of this evaluation report is provided to requesting physician.      "

## 2024-01-17 ENCOUNTER — ANESTHESIA EVENT (OUTPATIENT)
Dept: SURGERY | Facility: AMBULATORY SURGERY CENTER | Age: 54
End: 2024-01-17
Payer: COMMERCIAL

## 2024-01-17 ENCOUNTER — PREP FOR PROCEDURE (OUTPATIENT)
Dept: ORTHOPEDICS | Facility: CLINIC | Age: 54
End: 2024-01-17
Payer: COMMERCIAL

## 2024-01-19 ENCOUNTER — PRE VISIT (OUTPATIENT)
Dept: ORTHOPEDICS | Facility: CLINIC | Age: 54
End: 2024-01-19
Payer: COMMERCIAL

## 2024-01-19 ENCOUNTER — HOSPITAL ENCOUNTER (OUTPATIENT)
Facility: AMBULATORY SURGERY CENTER | Age: 54
Discharge: HOME | End: 2024-01-19
Attending: STUDENT IN AN ORGANIZED HEALTH CARE EDUCATION/TRAINING PROGRAM | Admitting: STUDENT IN AN ORGANIZED HEALTH CARE EDUCATION/TRAINING PROGRAM
Payer: COMMERCIAL

## 2024-01-19 ENCOUNTER — ANESTHESIA (OUTPATIENT)
Dept: SURGERY | Facility: AMBULATORY SURGERY CENTER | Age: 54
End: 2024-01-19
Payer: COMMERCIAL

## 2024-01-19 VITALS
RESPIRATION RATE: 16 BRPM | HEART RATE: 84 BPM | TEMPERATURE: 97.8 F | OXYGEN SATURATION: 97 % | HEIGHT: 71 IN | BODY MASS INDEX: 32.2 KG/M2 | WEIGHT: 230 LBS | DIASTOLIC BLOOD PRESSURE: 75 MMHG | SYSTOLIC BLOOD PRESSURE: 109 MMHG

## 2024-01-19 DIAGNOSIS — M65.931 EXTENSOR TENOSYNOVITIS OF RIGHT WRIST: Primary | ICD-10-CM

## 2024-01-19 DIAGNOSIS — G56.01 CARPAL TUNNEL SYNDROME OF RIGHT WRIST: ICD-10-CM

## 2024-01-19 DIAGNOSIS — R20.0 NUMBNESS OF FINGERS: ICD-10-CM

## 2024-01-19 PROCEDURE — 25116 REMOVE WRIST/FOREARM LESION: CPT | Mod: RT

## 2024-01-19 PROCEDURE — 14040 TIS TRNFR F/C/C/M/N/A/G/H/F: CPT | Mod: RT

## 2024-01-19 PROCEDURE — 64721 CARPAL TUNNEL SURGERY: CPT | Mod: XS,RT

## 2024-01-19 PROCEDURE — 64718 REVISE ULNAR NERVE AT ELBOW: CPT | Mod: XS,RT

## 2024-01-19 PROCEDURE — 88305 TISSUE EXAM BY PATHOLOGIST: CPT | Mod: TC | Performed by: STUDENT IN AN ORGANIZED HEALTH CARE EDUCATION/TRAINING PROGRAM

## 2024-01-19 PROCEDURE — 88305 TISSUE EXAM BY PATHOLOGIST: CPT | Mod: 26 | Performed by: PATHOLOGY

## 2024-01-19 PROCEDURE — 88304 TISSUE EXAM BY PATHOLOGIST: CPT | Mod: 26 | Performed by: PATHOLOGY

## 2024-01-19 RX ORDER — LIDOCAINE HYDROCHLORIDE 20 MG/ML
INJECTION, SOLUTION INFILTRATION; PERINEURAL PRN
Status: DISCONTINUED | OUTPATIENT
Start: 2024-01-19 | End: 2024-01-19

## 2024-01-19 RX ORDER — PROPOFOL 10 MG/ML
INJECTION, EMULSION INTRAVENOUS CONTINUOUS PRN
Status: DISCONTINUED | OUTPATIENT
Start: 2024-01-19 | End: 2024-01-19

## 2024-01-19 RX ORDER — OXYCODONE HYDROCHLORIDE 5 MG/1
5 TABLET ORAL EVERY 6 HOURS PRN
Qty: 10 TABLET | Refills: 0 | Status: SHIPPED | OUTPATIENT
Start: 2024-01-19 | End: 2024-01-22

## 2024-01-19 RX ORDER — LIDOCAINE 40 MG/G
CREAM TOPICAL
Status: DISCONTINUED | OUTPATIENT
Start: 2024-01-19 | End: 2024-01-20 | Stop reason: HOSPADM

## 2024-01-19 RX ORDER — OXYCODONE HYDROCHLORIDE 5 MG/1
5 TABLET ORAL
Status: DISCONTINUED | OUTPATIENT
Start: 2024-01-19 | End: 2024-01-20 | Stop reason: HOSPADM

## 2024-01-19 RX ORDER — NALOXONE HYDROCHLORIDE 0.4 MG/ML
0.4 INJECTION, SOLUTION INTRAMUSCULAR; INTRAVENOUS; SUBCUTANEOUS
Status: DISCONTINUED | OUTPATIENT
Start: 2024-01-19 | End: 2024-01-20 | Stop reason: HOSPADM

## 2024-01-19 RX ORDER — FENTANYL CITRATE 50 UG/ML
25-50 INJECTION, SOLUTION INTRAMUSCULAR; INTRAVENOUS
Status: DISCONTINUED | OUTPATIENT
Start: 2024-01-19 | End: 2024-01-20 | Stop reason: HOSPADM

## 2024-01-19 RX ORDER — ACETAMINOPHEN 325 MG/1
975 TABLET ORAL ONCE
Status: COMPLETED | OUTPATIENT
Start: 2024-01-19 | End: 2024-01-19

## 2024-01-19 RX ORDER — FLUMAZENIL 0.1 MG/ML
0.2 INJECTION, SOLUTION INTRAVENOUS
Status: DISCONTINUED | OUTPATIENT
Start: 2024-01-19 | End: 2024-01-20 | Stop reason: HOSPADM

## 2024-01-19 RX ORDER — ONDANSETRON 4 MG/1
4 TABLET, ORALLY DISINTEGRATING ORAL EVERY 30 MIN PRN
Status: DISCONTINUED | OUTPATIENT
Start: 2024-01-19 | End: 2024-01-20 | Stop reason: HOSPADM

## 2024-01-19 RX ORDER — NALOXONE HYDROCHLORIDE 0.4 MG/ML
0.2 INJECTION, SOLUTION INTRAMUSCULAR; INTRAVENOUS; SUBCUTANEOUS
Status: DISCONTINUED | OUTPATIENT
Start: 2024-01-19 | End: 2024-01-20 | Stop reason: HOSPADM

## 2024-01-19 RX ORDER — KETOROLAC TROMETHAMINE 30 MG/ML
INJECTION, SOLUTION INTRAMUSCULAR; INTRAVENOUS PRN
Status: DISCONTINUED | OUTPATIENT
Start: 2024-01-19 | End: 2024-01-19

## 2024-01-19 RX ORDER — CEFAZOLIN SODIUM 2 G/50ML
2 SOLUTION INTRAVENOUS SEE ADMIN INSTRUCTIONS
Status: DISCONTINUED | OUTPATIENT
Start: 2024-01-19 | End: 2024-01-20 | Stop reason: HOSPADM

## 2024-01-19 RX ORDER — FENTANYL CITRATE 50 UG/ML
INJECTION, SOLUTION INTRAMUSCULAR; INTRAVENOUS PRN
Status: DISCONTINUED | OUTPATIENT
Start: 2024-01-19 | End: 2024-01-19

## 2024-01-19 RX ORDER — OXYCODONE HYDROCHLORIDE 5 MG/1
10 TABLET ORAL
Status: DISCONTINUED | OUTPATIENT
Start: 2024-01-19 | End: 2024-01-20 | Stop reason: HOSPADM

## 2024-01-19 RX ORDER — BUPIVACAINE HYDROCHLORIDE 5 MG/ML
INJECTION, SOLUTION EPIDURAL; INTRACAUDAL
Status: COMPLETED | OUTPATIENT
Start: 2024-01-19 | End: 2024-01-19

## 2024-01-19 RX ORDER — MAGNESIUM HYDROXIDE 1200 MG/15ML
LIQUID ORAL PRN
Status: DISCONTINUED | OUTPATIENT
Start: 2024-01-19 | End: 2024-01-19 | Stop reason: HOSPADM

## 2024-01-19 RX ORDER — ONDANSETRON 2 MG/ML
4 INJECTION INTRAMUSCULAR; INTRAVENOUS EVERY 30 MIN PRN
Status: DISCONTINUED | OUTPATIENT
Start: 2024-01-19 | End: 2024-01-20 | Stop reason: HOSPADM

## 2024-01-19 RX ORDER — SODIUM CHLORIDE, SODIUM LACTATE, POTASSIUM CHLORIDE, CALCIUM CHLORIDE 600; 310; 30; 20 MG/100ML; MG/100ML; MG/100ML; MG/100ML
INJECTION, SOLUTION INTRAVENOUS CONTINUOUS
Status: DISCONTINUED | OUTPATIENT
Start: 2024-01-19 | End: 2024-01-20 | Stop reason: HOSPADM

## 2024-01-19 RX ORDER — ONDANSETRON 2 MG/ML
INJECTION INTRAMUSCULAR; INTRAVENOUS PRN
Status: DISCONTINUED | OUTPATIENT
Start: 2024-01-19 | End: 2024-01-19

## 2024-01-19 RX ORDER — CEFAZOLIN SODIUM 2 G/50ML
2 SOLUTION INTRAVENOUS
Status: COMPLETED | OUTPATIENT
Start: 2024-01-19 | End: 2024-01-19

## 2024-01-19 RX ORDER — PROPOFOL 10 MG/ML
INJECTION, EMULSION INTRAVENOUS PRN
Status: DISCONTINUED | OUTPATIENT
Start: 2024-01-19 | End: 2024-01-19

## 2024-01-19 RX ADMIN — PROPOFOL 150 MCG/KG/MIN: 10 INJECTION, EMULSION INTRAVENOUS at 11:15

## 2024-01-19 RX ADMIN — PROPOFOL 30 MG: 10 INJECTION, EMULSION INTRAVENOUS at 14:06

## 2024-01-19 RX ADMIN — BUPIVACAINE HYDROCHLORIDE 30 ML: 5 INJECTION, SOLUTION EPIDURAL; INTRACAUDAL at 10:45

## 2024-01-19 RX ADMIN — PROPOFOL 80 MG: 10 INJECTION, EMULSION INTRAVENOUS at 11:15

## 2024-01-19 RX ADMIN — CEFAZOLIN SODIUM 2 G: 2 SOLUTION INTRAVENOUS at 11:06

## 2024-01-19 RX ADMIN — FENTANYL CITRATE 50 MCG: 50 INJECTION, SOLUTION INTRAMUSCULAR; INTRAVENOUS at 10:40

## 2024-01-19 RX ADMIN — KETOROLAC TROMETHAMINE 30 MG: 30 INJECTION, SOLUTION INTRAMUSCULAR; INTRAVENOUS at 14:01

## 2024-01-19 RX ADMIN — PROPOFOL 20 MG: 10 INJECTION, EMULSION INTRAVENOUS at 11:37

## 2024-01-19 RX ADMIN — ONDANSETRON 4 MG: 2 INJECTION INTRAMUSCULAR; INTRAVENOUS at 11:15

## 2024-01-19 RX ADMIN — SODIUM CHLORIDE, SODIUM LACTATE, POTASSIUM CHLORIDE, CALCIUM CHLORIDE: 600; 310; 30; 20 INJECTION, SOLUTION INTRAVENOUS at 13:36

## 2024-01-19 RX ADMIN — FENTANYL CITRATE 25 MCG: 50 INJECTION, SOLUTION INTRAMUSCULAR; INTRAVENOUS at 12:39

## 2024-01-19 RX ADMIN — LIDOCAINE HYDROCHLORIDE 100 MG: 20 INJECTION, SOLUTION INFILTRATION; PERINEURAL at 11:15

## 2024-01-19 RX ADMIN — ACETAMINOPHEN 975 MG: 325 TABLET ORAL at 10:22

## 2024-01-19 RX ADMIN — SODIUM CHLORIDE, SODIUM LACTATE, POTASSIUM CHLORIDE, CALCIUM CHLORIDE: 600; 310; 30; 20 INJECTION, SOLUTION INTRAVENOUS at 10:22

## 2024-01-19 RX ADMIN — PROPOFOL 140 MCG/KG/MIN: 10 INJECTION, EMULSION INTRAVENOUS at 11:23

## 2024-01-19 RX ADMIN — PROPOFOL 140 MCG/KG/MIN: 10 INJECTION, EMULSION INTRAVENOUS at 11:53

## 2024-01-19 RX ADMIN — FENTANYL CITRATE 75 MCG: 50 INJECTION, SOLUTION INTRAMUSCULAR; INTRAVENOUS at 11:37

## 2024-01-19 ASSESSMENT — LIFESTYLE VARIABLES: TOBACCO_USE: 0

## 2024-01-19 ASSESSMENT — ENCOUNTER SYMPTOMS: ORTHOPNEA: 0

## 2024-01-19 ASSESSMENT — COPD QUESTIONNAIRES: COPD: 0

## 2024-01-19 NOTE — BRIEF OP NOTE
[facility]    Brief Operative Note    Pre-operative diagnosis: Extensor tenosynovitis of right wrist [M65.831]  Post-operative diagnosis Same as pre-operative diagnosis    Procedure: EXTENSOR TENDON DEBRIDEMENT RIGHT WRIST, Right - Wrist  right release cubital tunnel, Right - Elbow  right release carpal tunnel, Right - Wrist    Surgeon: Surgeon(s) and Role:     * Venkat Walsh MD - Primary     * Leonid Guo PA-C - Assisting  Anesthesia: MAC with Block   Estimated Blood Loss: 20cc    Drains: None  Specimens:   ID Type Source Tests Collected by Time Destination   1 : Right Wrist Subcutaneous Scar Tissue Wrist, Right SURGICAL PATHOLOGY EXAM Venkat Walsh MD 1/19/2024 11:44 AM    2 : Right Wrist Extensor Tenosenovium Tissue Wrist, Right SURGICAL PATHOLOGY EXAM Venkat Walsh MD 1/19/2024 11:58 AM      Findings:   See operative report .  Complications: None.  Implants: * No implants in log *    Venkat Wlash MD    Hand & Upper Extremity Surgery  Department of Orthopedic Surgery  [facility]

## 2024-01-19 NOTE — ANESTHESIA CARE TRANSFER NOTE
Patient: Scott Fernandez    Procedure: Procedure(s):  EXTENSOR TENDON DEBRIDEMENT RIGHT WRIST  right release cubital tunnel  right release carpal tunnel       Diagnosis: Extensor tenosynovitis of right wrist [M65.831]  Diagnosis Additional Information: No value filed.    Anesthesia Type:   MAC     Note:    Oropharynx: oropharynx clear of all foreign objects and spontaneously breathing  Level of Consciousness: awake  Oxygen Supplementation: room air    Independent Airway: airway patency satisfactory and stable  Dentition: dentition unchanged  Vital Signs Stable: post-procedure vital signs reviewed and stable  Report to RN Given: handoff report given  Patient transferred to: Phase II    Handoff Report: Identifed the Patient, Identified the Reponsible Provider, Reviewed the pertinent medical history, Discussed the surgical course, Reviewed Intra-OP anesthesia mangement and issues during anesthesia, Set expectations for post-procedure period and Allowed opportunity for questions and acknowledgement of understanding      Vitals:  Vitals Value Taken Time   /85 01/19/24 1416   Temp 36.3  C (97.4  F) 01/19/24 1416   Pulse 88    Resp 16 01/19/24 1416   SpO2 96 % 01/19/24 1416       Electronically Signed By: NIDHI Blancas CRNA  January 19, 2024  2:21 PM

## 2024-01-19 NOTE — OP NOTE
Patient: Scott Fernandez  : 1970  MRN: [id number]    DATE OF OPERATION: 2024      OPERATIVE REPORT       PREOPERATIVE DIAGNOSIS:  1) Extensor tenosynovitis and impingement right wrist  2) Recurrent right carpal tunnel syndrome  3) Right cubital tunnel syndrome     POSTOPERATIVE DIAGNOSIS:  1) Extensor tenosynovitis and impingement right wrist  2) Recurrent right carpal tunnel syndrome  3) Right cubital tunnel syndrome     PROCEDURE:  1) Radical tenosynovectomy of right wrist extensor compartments two, three, four, and five with lengthening of extensor retinaculum CPT 25846  2) Revision right carpal tunnel release with median nerve neurolysis CPT 65387  3) Hypothenar fat pad flap CPT 84510  4) Right in-situ cubital tunnel release CPT 43179      SURGEON:  Venkat Walsh MD     ASSISTANT(S):  GRETA Nicholson's assistance was required as there were no qualified residents available.    ANESTHESIA:  MAC plus regional     IMPLANTS:   None    ESTIMATED BLOOD LOSS:  20 cc    DVT PROPHYLAXIS:  SCDs    TOURNIQUET TIME:  80 minutes    SPECIMENS REMOVED:  Right dorsal wrist subcutaneous tissue and right wrist extensor tenosynovium for pathology    INTRAOPERATIVE FINDINGS:  Fibrous scarring of the subcutaneous fat at the dorsal wrist, superficial to the extensor retinaculum.  Diffuse tenosynovitis of the wrist extensors, worst at the fourth dorsal compartment.  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  Mild tenosynovitis of the fifth dorsal compartment tendons.  Reconstituted transverse carpal ligament at the carpal tunnel.  Bifid median nerve with persistent median vessels.  Radial division of the median nerve densely scarred to the undersurface of the transverse carpal ligament.  Recurrent motor branch off of this division extra ligamentous and radial.  Compressed ulnar  nerve at the cubital tunnel.    COMPLICATIONS:  None    DISPOSITION:  Stable to PACU.     INDICATIONS:  The patient is a 53-year-old male with longstanding history of right wrist pain.  Localized primarily to the dorsal wrist.  Imaging studies were consistent with tenosynovitis of the extensor compartments, worst at the fourth dorsal compartment.  Symptoms were refractory to all conservative management, including oral steroids, injections, and immobilization.  He wished to proceed with surgical intervention.  The patient also reported persistent numbness and tingling in the thumb as well as the ring and small fingers.  He did have a prior history of a right carpal tunnel release years ago.  Although EMG and ultrasound findings were unremarkable, he did demonstrate provocative signs at the carpal tunnel on exam and symptoms are consistent with cubital tunnel syndrome.    The indications for surgery were discussed with the patient. The benefits, risks, and alternatives of operative management were discussed in detail with the patient. The patient understands that the risks of surgery include, but are not limited to: infection, bleeding, injury to nearby structures (such as nerves, blood vessels, and tendons), need for additional surgery, pain, stiffness, scarring, need for rehabilitation, and anesthetic complications. I advised that my main concern is that he will obtain incomplete relief. Patient expressed understanding and elected to proceed with surgery. All questions were answered to the patient's satisfaction      Consent was obtained for the procedure.     DESCRIPTION OF PROCEDURE IN DETAIL:  The patient was seen in the preoperative care unit. Patient identity, consent, procedure to be performed, and operative site were verified with the patient. The right upper extremity was marked.  The regional anesthesia team performed a preoperative block.     The patient was brought to the operating room and placed supine  on the operating room table. The right upper extremity was placed on an armboard. SCDs were placed on the bilateral lower extremities. A well-padded tourniquet was placed on the upper arm.    Sedation was administered by anesthesia.    The right upper extremity was prepped and draped in the usual sterile fashion. A timeout was performed confirming the correct patient, procedure, operative site, and antibiotics. All were in agreement.     The limb was exsanguinated with Esmarch and the tourniquet inflated to 250 mmHg.  A longitudinal incision was made over the dorsal wrist.  Blunt dissection was carried down through subcutaneous tissues.  We immediately encountered dense fibrous tissue consistent in appearance with posttraumatic scarring.  This was carefully dissected free of the surrounding tissues and of the dorsal radial sensory nerve.  It was sent for pathology.  The radial sensory nerve was protected throughout the remainder of the case.  The extensor tendons were identified distal to the extensor retinaculum and the tenosynovium release.  A step cut lengthening was then performed of the extensor retinaculum and radial and ulnar based flaps elevated off of the extensor tendons.  We immediately took note of significant hyperemic and inflamed tenosynovitis of the fourth dorsal compartment tendons.  Interestingly as we elevated the radial leaflet off the tendons, the extensor pollicis longus tendon was identified in an anomalous position radial to Verna's tubercle.  It was found to cross the extensor carpi radialis tendons at this location, and tenosynovitis was identified at this intersection.  The intersection of the APL tendon over the extensor carpi radialis tendons proximally was also visualized.  There did not appear to be any tenosynovitis there involving the first dorsal compartment tendon.  We continued to elevate the ulnar retinacular flap over the fifth dorsal compartment and extensor digiti minimi  tendon, which had mild tenosynovitis.  The tenosynovitis of all tendons were then radically debrided and sent for pathology.  At the conclusion of this portion of the procedure, all tendons appeared healthy and devoid of inflamed tissue.    We next turned our attention volarly to the carpal tunnel.  A longitudinal incision was made through his prior scar.  Blunt dissection was carried down through subcutaneous tissues to the superficial palmar fascia which was divided longitudinally with a scalpel.  The transverse carpal ligament was exposed and had reconstituted.  This was incised longitudinally exposing the median nerve below.  Due to the postsurgical scarring, additional exposure was needed the incision was extended distally as well as proximally, angling obliquely over the wrist creases using the prior incision.  Distally the remaining superficial palmar fascia and transverse carpal ligament was divided under direct visualization.  Proximally the antebrachial fascia was divided likewise under direct visualization.  The median nerve had a bifid anatomy with large persistent median vessels. The radial division was densely scarred to the undersurface of the transverse carpal ligament. It was carefully neurolysed, dissected free, and followed distally where it was noted to course towards the thumb.  The recurrent motor branch was identified off of this division, and noted to be extra ligamentous.  At the conclusion of this portion of the procedure, the median nerve was fully decompressed and had excellent excursion.    Due to the scarring and revision nature, decision was made to harvest a hypothenar fat pad flap to cover the nerve.  The hypothenar fat was carefully dissected off of the dermis with a [place] blade and mobilized.  Dissection was carried ulnarly and the ulnar artery pedicle visualized and the perforating vessels preserved.  The fat was then transposed radially covering the median nerve and sutured to  the undersurface of the radial leaflet of the transverse carpal ligament with 3-0 Vicryl.    Attention was turned to the elbow. A curvilinear incision was made directly over the cubital tunnel extending proximally along the course of the ulnar nerve and distally over the FCU.  Blunt dissection was carried down through the skin and subcutaneous tissues to the medial epicondyle.  Branches of the medial antebrachial cutaneous nerve were identified and protected throughout the remainder of the case.  The ulnar nerve was identified proximal to the cubital tunnel posterior to the intermuscular septum and followed distally into the cubital tunnel.  Tariq's ligament was incised sharply.  The ulnar nerve was then followed distally into the FCU.  The FCU fascia was released and the 2 heads of the FCU were developed bluntly.  The motor branches of the ulnar nerve to the FCU were identified and protected.  Distally, the deep flexor pronator fascia was also released under direct visualization.  Attention was then turned to the ulnar nerve proximally.  It was followed along the intermuscular septum until the arcade of [place] was identified.  These fibers were released under direct visualization.  A complete ulnar nerve release was performed and the nerve was found to have excellent excursion.      The elbow was taken through a range of motion.  The ulnar nerve was found to be stable.  As such, the nerve was left in situ and no transposition was performed.  The intermuscular septum was very thick, and a strip down through the medial epicondyle was excised to decompress the nerve.    The wounds were then copiously irrigated.  Tourniquet was deflated and hemostasis was assured of all wound beds.  The radial and ulnar extensor retinacular flaps were then replaced over the extensor tendons and sutured together with running locking 3-0 Ethibond.  The retinaculum was effectively lengthened to remove any impingement.  The extensor  pollicis longus was left transposed in the radial subcutaneous tissues.  The deep dermal tissues at the dorsal wrist and the medial elbow were closed with 3-0 Vicryl and the skin closed with a running 4-0 Monocryl subcuticular suture.  The carpal tunnel incision was closed with interrupted 4-0 nylon horizontal mattress sutures.    Steri-Strips, Adaptic and a sterile dressing were placed.  The right upper extremity was then placed in a well-padded long-arm splint.    The patient was then awoken from anesthesia and taken to the postop recovery unit in stable condition.  All needle and sponge counts were correct at the end the procedure.  There were no complications.    I was present and scrubbed for the entire surgery.    POSTOPERATIVE PLAN:  The patient will be discharged home.  He will be nonweightbearing of the right upper extremity.  The patient will return in 10 to 14 days for wound check.  Carpal tunnel incision sutures will be removed at that time.  The patient will then see hand therapy for a custom thermoplastic wrist splint as needed and to begin wrist and elbow range of motion.    Venkat Walsh MD    Hand & Upper Extremity Surgery  Department of Orthopedic Surgery  [facility]

## 2024-01-19 NOTE — TELEPHONE ENCOUNTER
Received Completed forms Yes   Faxed Forms Faxed To: [employer] [facility]   Fax Number: [phone]   Sent to HIM (Date) 1/18/24

## 2024-01-19 NOTE — OR NURSING
Patient received right side Brachial Plexus nerve block  without Exparel.  Fentanyl 50mcg and Versed 1mg given. Tolerated procedure well.   Mandi Harry RN

## 2024-01-19 NOTE — DISCHARGE INSTRUCTIONS
Procedure Performed: Right wrist extensor tendon debridement, right cubital tunnel release, right carpal tunnel release  Attending Surgeon: Venkat Walsh MD  Date: 1/19/2024    DIAGNOSIS  1. Extensor tenosynovitis of right wrist    2. Numbness of fingers    3. Carpal tunnel syndrome of right wrist        MEDICATIONS   Resume all home medications as directed unless otherwise instructed during this hospitalization. If there is any question, double check with your primary care provider.  Start new discharge medications as directed.    Take 1-2 tablets of extra strength Tylenol 500 mg every 6 hours. You may also take 400-600 mg of ibuprofen every 6 hours.    For breakthrough pain use narcotic pain medication as prescribed.    Do not drive or operate machinery while taking narcotic pain medications.   If you are taking other Tylenol containing medicines at home, be sure NOT to exceed 4 gram's (4000 milligrams) of Tylenol per day.   Do NOT exceed 2400 mg of ibuprofen per day.  If you are taking pain medications, be sure to take Colace (docusate sodium) as well to prevent constipation. If constipated, try adding another cathartic or enema.  If nausea and vomiting, call the hospital or seek medical attention.    ACTIVITY   Weight bearing: Non-weight bearing to operative extremity.    DIET  Resume same diet prior to your hospital admission.    WOUND   Leave dressing on until you are seen in clinic for your follow up visit.   Watch for signs and symptoms of infection of your wounds including; pain, redness, swelling, drainage or fever.  If you notice any of these symptoms please call or seek medical attention.    Keep wound clean, dry, and intact.  Do not submerge wounds in water until they are healed. No baths, soaking, swimming, or prolonged water exposure for 4 weeks after surgery.    RETURN   Follow-up with Orthopedic Clinic as directed.     Future Appointments   Date Time Provider Department Center   1/29/2024  1:30 PM  Venkat Walhs MD [facility]   1/29/2024  2:30 PM Kelly Johnston OT [facility]   5/23/2024  1:00 PM Elizabeth Sauer NP [facility] [place]       Call the [facility] at [phone] during business hours for any symptoms such as:    * Fevers with Temperature greater than 101.5 degrees.   * Pus drainage from wound site.   * Severe pain, not controlled by medication.   * Persistent nausea, vomiting and inablility to tolerate fluids.    If you are receiving care in [place], you may call the Orthopedic clinic at [phone].    FOR URGENT PROBLEMS ONLY, after hours or on weekends call the hospital  at [phone] and ask to speak with the orthopedic resident on call.        [facility]  Home Care Following Anesthesia  For 24 hours after surgery:  Get plenty of rest.  A responsible adult must stay with you for at least 24 hours after you leave the surgery center.  Do not drive or use heavy equipment.  If you have weakness or tingling, don't drive or use heavy equipment until this feeling goes away.   Do not drink alcohol.   Avoid strenuous or risky activities.  Ask for help when climbing stairs.  You may feel lightheaded.  IF so, sit for a few minutes before standing.  Have someone help you get up.   If you have nausea (feel sick to your stomach): Drink only clear liquids such as apple juice, ginger ale, broth or 7-Up.  Rest may also help.  Be sure to drink enough fluids.  Move to a regular diet as you feel able.   You may have a slight fever.  Call the doctor if your fever is over 100 F (37.7 C) (taken under the tongue) or lasts longer than 24 hours.  You may have a dry mouth, a sore throat, muscle aches or trouble sleeping. These should go away after 24 hours.  Do not make important or legal decisions.   It is recommended to avoid smoking.        Today you received a Marcaine or bupivacaine block to numb the nerves near your surgery site.  This  "is a block using local anesthetic or \"numbing\" medication injected around the nerves to anesthetize or \"numb\" the area supplied by those nerves.  This block is injected into the muscle layer near your surgical site.  The medication may numb the location where you had surgery for 6-18 hours, but may last up to 24 hours.  If your surgical site is an arm or leg you should be careful with your affected limb, since it is possible to injure your limb without being aware of it due to the numbing.  Until full feeling returns, you should guard against bumping or hitting your limb, and avoid extreme hot or cold temperatures on the skin.  As the block wears off, the feeling will return as a tingling or prickly sensation near your surgical site.  You will experience more discomfort from your incision as the feeling returns.  You may want to take a pain pill (a narcotic or Tylenol if this was prescribed by your surgeon) when you start to experience mild pain before the pain beccomes more severe.  If your pain medications do not control your pain you should notifiy your surgeon.    Tips for taking pain medications  To get the best pain relief possible, remember these points:  Take pain medications as directed, before pain becomes severe.  Pain medication can upset your stomach: taking it with food may help.  Constipation is a common side effect of pain medication. Drink plenty of  fluids.  Eat foods high in fiber. Take a stool softener if recommended by your doctor or pharmacist.  Do not drink alcohol, drive or operate machinery while taking pain medications.  Ask about other ways to control pain, such as with heat, ice or relaxation.    Tylenol/Acetaminophen Consumption    If you feel your pain relief is insufficient, you may take Tylenol/Acetaminophen in addition to your narcotic pain medication.   Be careful not to exceed 4,000 mg of Tylenol/Acetaminophen in a 24 hour period from all sources.  If you are taking extra strength " Tylenol/acetaminophen (500 mg), the maximum dose is 8 tablets in 24 hours.  If you are taking regular strength acetaminophen (325 mg), the maximum dose is 12 tablets in 24 hours.    975 mg Tylenol taken at 10:22 AM, OK to take additional Tylenol after 4:22 PM. Follow package instructions.    Call a doctor for any of the following:  Signs of infection (fever, growing tenderness at the surgery site, a large amount of drainage or bleeding, severe pain, foul-smelling drainage, redness, swelling).  It has been over 8 to 10 hours since surgery and you are still not able to urinate (pass water).  Headache for over 24 hours.  Signs of Covid-19 infection (temperature over 100 degrees, shortness of breath, cough, loss of taste/smell, generalized body aches, persistent headache, chills, sore throat, nausea/vomiting/diarrhea)  Your doctor is:     Dr. Venkat Walsh, Orthopaedics: [phone]               Or dial [phone] and ask for the resident on call for:  Orthopaedics  For emergency care, call the:  [facility]: [phone] (TTY for hearing impaired: [phone])

## 2024-01-19 NOTE — ANESTHESIA POSTPROCEDURE EVALUATION
Patient: Scott Fernandez    Procedure: Procedure(s):  EXTENSOR TENDON DEBRIDEMENT RIGHT WRIST  right release cubital tunnel  right release carpal tunnel       Anesthesia Type:  MAC    Note:  Disposition: Outpatient   Postop Pain Control: Uneventful            Sign Out: Well controlled pain   PONV: No   Neuro/Psych: Uneventful            Sign Out: Acceptable/Baseline neuro status   Airway/Respiratory: Uneventful            Sign Out: Acceptable/Baseline resp. status   CV/Hemodynamics: Uneventful            Sign Out: Acceptable CV status; No obvious hypovolemia; No obvious fluid overload   Other NRE:    DID A NON-ROUTINE EVENT OCCUR?     Event details/Postop Comments:  Doing well. Alert, oriented. Block working well for pain control. Able to wiggle all fingers. Patient denies any concerns.                Last vitals:  Vitals Value Taken Time   /75 01/19/24 1431   Temp 36.6  C (97.8  F) 01/19/24 1431   Pulse     Resp 16 01/19/24 1431   SpO2 97 % 01/19/24 1431       Electronically Signed By: Mercedes Calabrese MD  January 19, 2024  3:23 PM

## 2024-01-19 NOTE — ANESTHESIA PROCEDURE NOTES
Brachial plexus Procedure Note    Pre-Procedure   Staff -        Anesthesiologist:  Mercedes Calabrese MD       Resident/Fellow: Chato De La Vega       Performed By: resident and with residents       Procedure performed by resident/fellow/CRNA in presence of a teaching physician.         Location: pre-op       Procedure Start/Stop Times: 1/19/2024 10:35 AM and 1/19/2024 10:45 AM       Pre-Anesthestic Checklist: patient identified, IV checked, site marked, risks and benefits discussed, informed consent, monitors and equipment checked, pre-op evaluation, at physician/surgeon's request and post-op pain management  Timeout:       Correct Patient: Yes        Correct Procedure: Yes        Correct Site: Yes        Correct Position: Yes        Correct Laterality: Yes        Site Marked: Yes  Procedure Documentation  Procedure: Brachial plexus       Diagnosis: POSTOPERATIVE PAIN       Laterality: right       Patient Position: sitting       Patient Prep/Sterile Barriers: sterile gloves, mask       Skin prep: Chloraprep (supraclavicular approach).       Needle Type: short bevel and insulated       Needle Gauge: 21.        Needle Length (millimeters): 100        Ultrasound guided       1. Ultrasound was used to identify targeted nerve, plexus, vascular marker, or fascial plane and place a needle adjacent to it in real-time.       2. Ultrasound was used to visualize the spread of anesthetic in close proximity to the above referenced structure.       3. A permanent image is entered into the patient's record.    Assessment/Narrative         The placement was negative for: blood aspirated, painful injection and site bleeding       Paresthesias: Yes and Resolved.       Bolus given via needle..        Secured via.        Insertion/Infusion Method: Single Shot       Complications: none    Medication(s) Administered   Bupivacaine 0.5% PF (Infiltration) - Infiltration   30 mL - 1/19/2024 10:45:00 AM  Medication Administration Time:  "1/19/2024 10:35 AM     Comments:  Preoperative brachial plexus per surgeon request. Discussed risks of nerve block, including nerve injury, bleeding, infection, incomplete analgesia, lung injury. Discussed numbness in right fingers - reassuring that EMG is normal. Discussed possible increased risk of prolonged numbness/weakness/nerve injury given existing numbness, although would not expect this with a normal EMG study. Discussed block side effects, including phrenic nerve palsy, laryngeal nerve palsy, Sage's syndrome. Discussed anticipated areas of sensory and motor block, limb precautions. Discussed alternative of not performing a nerve block. Ensured understanding, invited questions and all questions were answered. Patient wishes to proceed.    Informed consent was obtained.   Patient tolerated well. Incremental aspiration every 5 mL. No paresthesia, no heme. Needle tip visualized throughout with appropriate spread of local anesthetic in \"corner pocket\". Intact baseline sensory and motor function immediately following conclusion of block. No evidence of pneumothorax: post-block ultrasound with lung sliding at most superior aspect.   Block was placed at the surgeon's request for post operative pain control.          FOR [facility] ONLY:   Pain Team Contact information: please page the Pain Team Via Statusly. Search \"Pain\". During daytime hours, please page the attending first. At night please page the resident first.      "

## 2024-01-29 ENCOUNTER — OFFICE VISIT (OUTPATIENT)
Dept: ORTHOPEDICS | Facility: CLINIC | Age: 54
End: 2024-01-29
Payer: COMMERCIAL

## 2024-01-29 ENCOUNTER — THERAPY VISIT (OUTPATIENT)
Dept: OCCUPATIONAL THERAPY | Facility: CLINIC | Age: 54
End: 2024-01-29
Payer: COMMERCIAL

## 2024-01-29 DIAGNOSIS — Z98.890 HISTORY OF CARPAL TUNNEL RELEASE OF BOTH WRISTS: ICD-10-CM

## 2024-01-29 DIAGNOSIS — M25.531 RIGHT WRIST PAIN: Primary | ICD-10-CM

## 2024-01-29 DIAGNOSIS — M79.641 HAND PAIN, RIGHT: Primary | ICD-10-CM

## 2024-01-29 DIAGNOSIS — M65.931 EXTENSOR TENOSYNOVITIS OF RIGHT WRIST: ICD-10-CM

## 2024-01-29 PROCEDURE — 99024 POSTOP FOLLOW-UP VISIT: CPT | Performed by: STUDENT IN AN ORGANIZED HEALTH CARE EDUCATION/TRAINING PROGRAM

## 2024-01-29 PROCEDURE — 97760 ORTHOTIC MGMT&TRAING 1ST ENC: CPT | Mod: GO | Performed by: OCCUPATIONAL THERAPIST

## 2024-01-29 PROCEDURE — 97110 THERAPEUTIC EXERCISES: CPT | Mod: GO | Performed by: OCCUPATIONAL THERAPIST

## 2024-01-29 PROCEDURE — 97166 OT EVAL MOD COMPLEX 45 MIN: CPT | Mod: GO | Performed by: OCCUPATIONAL THERAPIST

## 2024-01-29 NOTE — PROGRESS NOTES
OCCUPATIONAL THERAPY EVALUATION  Type of Visit: Evaluation    See electronic medical record for Abuse and Falls Screening details.    Date of Surgery: 1/19/24         Procedure  1) Radical tenosynovectomy of right wrist extensor compartments two, three, four, and five with lengthening of extensor retinaculum   2) Revision right carpal tunnel release with median nerve neurolysis   3) Hypothenar fat pad flap  4) Right in-situ cubital tunnel release     Subjective      Presenting condition or subjective complaint: post op care  Date of onset: 01/19/24    Relevant medical history:   2 prior CTR on the R. B shoulder surgeries  Dates & types of surgery: as above    Prior diagnostic imaging/testing results: MRI; X-ray     Prior therapy history for the same diagnosis, illness or injury:        Prior Level of Function  Transfers: Independent  Ambulation: Independent  ADL: Independent  IADL: IND in all IADLs    Living Environment  Social support: With a significant other or spouse   Type of home: House   Stairs to enter the home:         Ramp: No   Stairs inside the home: Yes   Is there a railing: Yes   Help at home: None  Equipment owned:       Employment:      Hobbies/Interests:      Patient goals for therapy: no pain or less    Pain assessment: See objective evaluation for additional pain details     Objective   ADDITIONAL HISTORY:  Right hand dominant  Patient reports symptoms of pain and edema  Transportation: drives  Currently not working due to present treatment problem    Functional Outcome Measure:   Upper Extremity Functional Index Score:  SCORE:   Column Totals: /80: 7   (A lower score indicates greater disability.)    PAIN:  Pain Level (Scale 0-10):   1/29/2024   At rest mild   At worst ~3   Up to 5 after surgery. Prior to that, pt was having high pain levels.     Pain Description:  Date 1/29/2024   Location R wrist, elbow, hand   Pain quality uncomfortable   Frequency constant     Pain is worst  daytime or  nighttime   Exacerbated by  use   Relieved by cold and rest   Progression Gradually getting better.      EDEMA:   Finger/Thumb   (Circumference measured in cm) 1/29/2024  R   L   Long P1 8.5 7.8     SCAR/WOUND:  steristrips to volar and dorsal wrist, and medial elbow    SENSATION: Decreased Median Nerve distribution per pt report     ROM:   Wrist ROM  Left AROM Right AROM    Extension 70 39   Flexion 40 ~ neutral   Radial Deviation (RD) 9    Ulnar Deviation (UD) 7    UD with Th Flex     Supination ~45    Pronation WNL      1/29/2024  R thumb opposition AROM is to the MF tip    Finger AROM    R hand     Date 1/29/2024    DPC to index (cm) 1.5    DPC to long (cm) 1    DPC to ring (cm) 1    DPC to small (cm) 1.5    As measured by Digit o Meter    ROM of Fingers:  1/29/2024  Gross finger extension: Pt is able to extend fingers well, lag at the PIPj and MPj    RESISTED TESTING: deferred    STRENGTH: deferred    Assessment & Plan   CLINICAL IMPRESSIONS  Medical Diagnosis: R CTS, R Cubital tunnel syndrome, extensor tendon tenosynovitis R wrist    Treatment Diagnosis: R wrist and elbow pain    Impression/Assessment: Pt is a 53 year old male presenting to Occupational Therapy due to condition as noted above.  The following significant findings have been identified: Impaired ROM, Impaired strength, Pain, and Post-surgical precautions.  These identified deficits interfere with their ability to perform self care tasks, work tasks, recreational activities, household chores, driving , and meal planning and preparation as compared to previous level of function.     Clinical Decision Making (Complexity):  Assessment of Occupational Performance: 5 or more Performance Deficits  Occupational Performance Limitations: dressing, hygiene and grooming, home establishment and management, meal preparation and cleanup, sleep, work, and leisure activities  Clinical Decision Making (Complexity): Moderate complexity    PLAN OF CARE  Treatment  Interventions:  Modalities:  US, Fluidotherapy, and Paraffin  Therapeutic Exercise:  AROM, AAROM, PROM, Tendon Gliding, Extensor Tracking, Isotonics, Isometrics, and Stabilization  Neuromuscular re-education:  Nerve Gliding, Desensitization, and Kinesiotaping  Manual Techniques:  Scar mobilization, Myofascial release, and Manual edema mobilization  Orthotic Fabrication:  Static and Forearm based  Self Care:  Self Care Tasks, Ergonomic Considerations, and Work Tasks    Long Term Goals   OT Goal 1  Goal Description: Pt will perform all ADL tasks with no pain, in order to return to baseline level of independent function.  Target Date: 03/29/24      Frequency of Treatment: 2 times per month  Duration of Treatment: 2 months     Recommended Referrals to Other Professionals:  NA  Education Assessment: Learner/Method: Patient;No Barriers to Learning     Risks and benefits of evaluation/treatment have been explained.   Patient/Family/caregiver agrees with Plan of Care.     Evaluation Time:    OT Eval, Moderate Complexity Minutes (47952): 20       Signing Clinician: Kelly Johnston OT

## 2024-01-29 NOTE — LETTER
[facility] [address] [address]  [phone]          January 29, 2024    RE:  Scott Fernandez                                                                                                                                                       [address] [address] [facility] [address]            To whom it may concern:    Scott Fernandez is under my professional care for Right wrist and elbow post op DOS: 1/19/24.  He  may return to work with the following: The employee is UNABLE to return to work until 3/12/24.          Sincerely,        Venkat Walsh MD

## 2024-01-29 NOTE — NURSING NOTE
Reason For Visit:   Chief Complaint   Patient presents with    Surgical Followup     Post op 1) Radical tenosynovectomy of right wrist extensor compartments two, three, four, and five with lengthening of extensor retinaculum 2) Revision right carpal tunnel release with median nerve neurolysis 3) Hypothenar fat pad flap 4) Right in-situ cubital tunnel release DOS 1/19/24         Primary MD: Fernando Mcfarlane  Ref. MD: saul    Age: 53 year old    ?  No      There were no vitals taken for this visit.      Pain Assessment  Patient Currently in Pain: Yes  0-10 Pain Scale: 3  Primary Pain Location: Hand (right)  Pain Descriptors: Discomfort, Sore, Tender  Alleviating Factors: NSAIDS    Hand Dominance Evaluation  Hand Dominance: Right          QuickDASH Assessment      1/23/2024    12:38 PM   QuickDASH Main   1. Open a tight or new jar Unable   2. Do heavy household chores (e.g., wash walls, floors) Unable   3. Carry a shopping bag or briefcase Unable   4. Wash your back Unable   5. Use a knife to cut food Unable   6. Recreational activities in which you take some force or impact through your arm, shoulder or hand (e.g., golf, hammering, tennis, etc.) Unable   7. During the past week, to what extent has your arm, shoulder or hand problem interfered with your normal social activities with family, friends, neighbours or groups Quite a bit   8. During the past week, were you limited in your work or other regular daily activities as a result of your arm, shoulder or hand problem Unable   9. Arm, shoulder or hand pain Mild   10.Tingling (pins and needles) in your arm,shoulder or hand None   11. During the past week, how much difficulty have you had sleeping because of the pain in your arm, shoulder or hand Mild difficulty   Quickdash Ability Score 75          Current Outpatient Medications   Medication Sig Dispense Refill    meloxicam (MOBIC) 7.5 MG tablet Take 1 tablet (7.5 mg) by mouth daily for 90 days 90  tablet 0       Allergies   Allergen Reactions    Nkda [No Known Drug Allergy]        Aislinn Owens, ATC

## 2024-01-29 NOTE — LETTER
1/29/2024         RE: Scott Fernandez  [address] [address] [facility] [address]        Dear Colleague,    Thank you for referring your patient, Scott Fernandez, to the [facility] [place]. Please see a copy of my visit note below.    Date of Service: Jan 29, 2024    Chief Complaint: Post operative follow up.     Date of Surgery: 1/19/24     Procedure Performed:   1) Radical tenosynovectomy of right wrist extensor compartments two, three, four, and five with lengthening of extensor retinaculum CPT 19632  2) Revision right carpal tunnel release with median nerve neurolysis CPT 35130  3) Hypothenar fat pad flap CPT 03263  4) Right in-situ cubital tunnel release CPT 44218     Interval events: Scott Fernandez is a 53 year old male who presents today for a postoperative follow up.  Reports that he is doing very well.  He has had minimal pain.  He is only needed Tylenol once or twice.  He reports his dorsal wrist pain has resolved completely.  Numbness and tingling is significantly improved.    The past medical history was reviewed updated in the EMR. This includes medications, surgeries, social history, and review of systems.    Physical examination:  Well-developed, well-nourished and in no acute distress.  Alert and oriented to surroundings.  On examination of the  right upper extremity, incisions are well-proximated. There is no erythema, drainage, or dehiscence. Swelling is Moderate. Sensation is intact in median, radial and ulnar nerve distributions.  2 discrimination 6 mm in the median nerve distribution, 7 mm in the ulnar nerve distribution.  Patient can actively flex and extend all digits and thumb. The patient is able to make a near composite fist. Fingers are warm and well-perfused.     Assessment: 53 year old male s/p above procedures, progressing appropriately.     Plan:    -Sutures removed today.  Patient will be seen by hand therapy for fabrication of a  short arm custom thermoplastic splint.  This should be worn as needed for comfort.  He will start wrist and elbow range of motion.  Avoid any heavy lifting, weightbearing through the palm while the surgical incisions healed.  He can start to get the incisions wet, no submerging until the wounds are fully healed.  Follow-up at 6 weeks postop recheck.    WANDA SARGENT PA-C  January 29, 2024 12:55 PM   Orthopaedic Surgery

## 2024-01-29 NOTE — PROGRESS NOTES
Date of Service: Jan 29, 2024    Chief Complaint: Post operative follow up.     Date of Surgery: 1/19/24     Procedure Performed:   1) Radical tenosynovectomy of right wrist extensor compartments two, three, four, and five with lengthening of extensor retinaculum CPT 73070  2) Revision right carpal tunnel release with median nerve neurolysis CPT 26534  3) Hypothenar fat pad flap CPT 98463  4) Right in-situ cubital tunnel release CPT 71766     Interval events: Scott Fernandez is a 53 year old male who presents today for a postoperative follow up.  Reports that he is doing very well.  He has had minimal pain.  He is only needed Tylenol once or twice.  He reports his dorsal wrist pain has resolved completely.  Numbness and tingling is significantly improved.    The past medical history was reviewed updated in the EMR. This includes medications, surgeries, social history, and review of systems.    Physical examination:  Well-developed, well-nourished and in no acute distress.  Alert and oriented to surroundings.  On examination of the  right upper extremity, incisions are well-proximated. There is no erythema, drainage, or dehiscence. Swelling is Moderate. Sensation is intact in median, radial and ulnar nerve distributions.  2 discrimination 6 mm in the median nerve distribution, 7 mm in the ulnar nerve distribution.  Patient can actively flex and extend all digits and thumb. The patient is able to make a near composite fist. Fingers are warm and well-perfused.     Assessment: 53 year old male s/p above procedures, progressing appropriately.     Plan:    -Sutures removed today.  Patient will be seen by hand therapy for fabrication of a short arm custom thermoplastic splint.  This should be worn as needed for comfort.  He will start wrist and elbow range of motion.  Avoid any heavy lifting, weightbearing through the palm while the surgical incisions healed.  He can start to get the incisions wet, no submerging  until the wounds are fully healed.  Follow-up at 6 weeks postop recheck.    WANDA SARGENT PA-C  January 29, 2024 12:55 PM   Orthopaedic Surgery

## 2024-02-08 ENCOUNTER — THERAPY VISIT (OUTPATIENT)
Dept: OCCUPATIONAL THERAPY | Facility: CLINIC | Age: 54
End: 2024-02-08
Payer: COMMERCIAL

## 2024-02-08 DIAGNOSIS — M79.641 HAND PAIN, RIGHT: Primary | ICD-10-CM

## 2024-02-08 DIAGNOSIS — M25.531 RIGHT WRIST PAIN: ICD-10-CM

## 2024-02-08 PROCEDURE — 97140 MANUAL THERAPY 1/> REGIONS: CPT | Mod: GO | Performed by: OCCUPATIONAL THERAPIST

## 2024-02-08 PROCEDURE — 97110 THERAPEUTIC EXERCISES: CPT | Mod: GO | Performed by: OCCUPATIONAL THERAPIST

## 2024-03-11 ENCOUNTER — OFFICE VISIT (OUTPATIENT)
Dept: ORTHOPEDICS | Facility: CLINIC | Age: 54
End: 2024-03-11
Payer: COMMERCIAL

## 2024-03-11 DIAGNOSIS — M65.931 EXTENSOR TENOSYNOVITIS OF RIGHT WRIST: Primary | ICD-10-CM

## 2024-03-11 PROCEDURE — 99024 POSTOP FOLLOW-UP VISIT: CPT | Performed by: STUDENT IN AN ORGANIZED HEALTH CARE EDUCATION/TRAINING PROGRAM

## 2024-03-11 NOTE — LETTER
3/11/2024         RE: Scott Fernandez  [address] [address] [facility] [address]        Dear Colleague,    Thank you for referring your patient, Scott Fernandez, to the [facility] [place]. Please see a copy of my visit note below.    Date of Service: Mar 11, 2024    Chief Complaint: Post operative follow up.     Date of Surgery: 1/19/24     Procedure Performed:   1) Radical tenosynovectomy of right wrist extensor compartments two, three, four, and five with lengthening of extensor retinaculum CPT 81552  2) Revision right carpal tunnel release with median nerve neurolysis CPT 75256  3) Hypothenar fat pad flap CPT 89455  4) Right in-situ cubital tunnel release CPT 60017     Interval events: Scott Fernandez is a 53 year old male who presents today for a postoperative follow up.  Continues to do well.  Dorsal wrist pain has completely resolved.  Numbness and tingling in the palm has also completely resolved.  He does have some limited thumb flexion and opposition with pain at the radial styloid, with some numbness over the dorsal thumb.  He is no longer going to formal OT, but doing exercises on his own.  He is using a soft neoprene wrist sleeve which he finds very helpful.  He thinks that his symptoms will improve, just needs to give it more time.    The past medical history was reviewed updated in the EMR. This includes medications, surgeries, social history, and review of systems.    Physical examination:  Well-developed, well-nourished and in no acute distress.  Alert and oriented to surroundings.  On examination of the  right upper extremity, incisions are well-healed.  There is no erythema, drainage, or dehiscence.  Mild swelling at the extensor tendons distal to the retinaculum.  Diminished wrist range of motion, wrist extension 30 degrees, flexion 10 degrees.  Sensation is intact in median, radial and ulnar nerve distributions.  2 discrimination 6 mm in the  median nerve distribution, 7 mm in the ulnar nerve distribution.  Patient can actively flex and extend all digits and thumb.  Mildly diminished flexion and opposition of the thumb.  Mild tenderness palpation at the radial styloid, positive Finkelstein's.  The patient is able to make a full composite fist, except for the thumb.  Fingers are warm and well-perfused.     Assessment: 53 year old male s/p above procedures, progressing appropriately.     Plan:     Continue hand therapy exercises and stretching on his own.      He does appear to have a degree of de Quervain's tenosynovitis with irritation of the dorsal radial sensory nerve.  I discussed the diagnosis, prognosis, and treatment options with the patient.  We deferred injections at this point given that they have not been effective for the patient in the past.  I do anticipate that it will improve as his wrist range of motion improves.    Patient may progressively weight-bear and strengthen as tolerated.  Patient is planning to return to work on Wednesday.  He is cleared to return to work without restrictions, note provided.    The patient may return to see me as needed.  The patient voiced understanding and agreement with this plan.  All questions answered.    Venkat Walsh MD    Hand & Upper Extremity Surgery  Department of Orthopedic Surgery  [facility]      Again, thank you for allowing me to participate in the care of your patient.        Sincerely,        Venkat Walsh MD

## 2024-03-11 NOTE — NURSING NOTE
Reason For Visit:   Chief Complaint   Patient presents with    Surgical Followup     Post op 1) Radical tenosynovectomy of right wrist extensor compartments two, three, four, and five with lengthening of extensor retinaculum 2) Revision right carpal tunnel release with median nerve neurolysis 3) Hypothenar fat pad flap 4) Right in-situ cubital tunnel release DOS 1/19/24       Primary MD: Fernando Mcfarlane  Ref. MD: saul    Age: 53 year old    ?  No      There were no vitals taken for this visit.      Pain Assessment  Patient Currently in Pain: Yes  0-10 Pain Scale: 3  Primary Pain Location: Wrist (right)  Pain Descriptors: Sore, Constant    Hand Dominance Evaluation  Hand Dominance: Right          QuickDASH Assessment      3/8/2024    10:30 AM   QuickDASH Main   1. Open a tight or new jar Moderate difficulty   2. Do heavy household chores (e.g., wash walls, floors) Mild difficulty   3. Carry a shopping bag or briefcase Mild difficulty   4. Wash your back Unable to answer   5. Use a knife to cut food Moderate difficulty   6. Recreational activities in which you take some force or impact through your arm, shoulder or hand (e.g., golf, hammering, tennis, etc.) Moderate difficulty   7. During the past week, to what extent has your arm, shoulder or hand problem interfered with your normal social activities with family, friends, neighbours or groups Slightly   8. During the past week, were you limited in your work or other regular daily activities as a result of your arm, shoulder or hand problem Unable to answer   9. Arm, shoulder or hand pain Mild   10.Tingling (pins and needles) in your arm,shoulder or hand Mild   11. During the past week, how much difficulty have you had sleeping because of the pain in your arm, shoulder or hand Mild difficulty   Quickdash Ability Score 22.73          Current Outpatient Medications   Medication Sig Dispense Refill    meloxicam (MOBIC) 7.5 MG tablet Take 1 tablet (7.5 mg)  by mouth daily for 90 days 90 tablet 0       Allergies   Allergen Reactions    Nkda [No Known Drug Allergy]        Aislinn Owens, ATC

## 2024-03-11 NOTE — PROGRESS NOTES
Date of Service: Mar 11, 2024    Chief Complaint: Post operative follow up.     Date of Surgery: 1/19/24     Procedure Performed:   1) Radical tenosynovectomy of right wrist extensor compartments two, three, four, and five with lengthening of extensor retinaculum CPT 90443  2) Revision right carpal tunnel release with median nerve neurolysis CPT 98385  3) Hypothenar fat pad flap CPT 78808  4) Right in-situ cubital tunnel release CPT 83955     Interval events: Scott Fernandez is a 53 year old male who presents today for a postoperative follow up.  Continues to do well.  Dorsal wrist pain has completely resolved.  Numbness and tingling in the palm has also completely resolved.  He does have some limited thumb flexion and opposition with pain at the radial styloid, with some numbness over the dorsal thumb.  He is no longer going to formal OT, but doing exercises on his own.  He is using a soft neoprene wrist sleeve which he finds very helpful.  He thinks that his symptoms will improve, just needs to give it more time.    The past medical history was reviewed updated in the EMR. This includes medications, surgeries, social history, and review of systems.    Physical examination:  Well-developed, well-nourished and in no acute distress.  Alert and oriented to surroundings.  On examination of the  right upper extremity, incisions are well-healed.  There is no erythema, drainage, or dehiscence.  Mild swelling at the extensor tendons distal to the retinaculum.  Diminished wrist range of motion, wrist extension 30 degrees, flexion 10 degrees.  Sensation is intact in median, radial and ulnar nerve distributions.  2 discrimination 6 mm in the median nerve distribution, 7 mm in the ulnar nerve distribution.  Patient can actively flex and extend all digits and thumb.  Mildly diminished flexion and opposition of the thumb.  Mild tenderness palpation at the radial styloid, positive Finkelstein's.  The patient is able to  make a full composite fist, except for the thumb.  Fingers are warm and well-perfused.     Assessment: 53 year old male s/p above procedures, progressing appropriately.     Plan:     Continue hand therapy exercises and stretching on his own.      He does appear to have a degree of de Quervain's tenosynovitis with irritation of the dorsal radial sensory nerve.  I discussed the diagnosis, prognosis, and treatment options with the patient.  We deferred injections at this point given that they have not been effective for the patient in the past.  I do anticipate that it will improve as his wrist range of motion improves.    Patient may progressively weight-bear and strengthen as tolerated.  Patient is planning to return to work on Wednesday.  He is cleared to return to work without restrictions, note provided.    The patient may return to see me as needed.  The patient voiced understanding and agreement with this plan.  All questions answered.    Venkat Walsh MD    Hand & Upper Extremity Surgery  Department of Orthopedic Surgery  [facility]

## 2024-03-27 PROBLEM — M79.641 HAND PAIN, RIGHT: Status: RESOLVED | Noted: 2024-01-29 | Resolved: 2024-03-27

## 2024-03-27 NOTE — PROGRESS NOTES
"Discharge Summary - Hand Therapy    Patient cancelled appointment 2/29/24 stating he is \"doing everything at home\" and did not return to therapy.  Assume all goals were met to patient's satisfaction. D/C from hand therapy.  "

## 2024-06-15 ENCOUNTER — HEALTH MAINTENANCE LETTER (OUTPATIENT)
Age: 54
End: 2024-06-15

## 2025-07-06 ENCOUNTER — HEALTH MAINTENANCE LETTER (OUTPATIENT)
Age: 55
End: 2025-07-06

## (undated) DEVICE — SOL NACL 0.9% IRRIG 3000ML BAG 2B7477

## (undated) DEVICE — SU VICRYL 3-0 SH 27" UND J416H

## (undated) DEVICE — PACK HAND CUSTOM ASC

## (undated) DEVICE — PREP CHLORAPREP 26ML TINTED ORANGE  260815

## (undated) DEVICE — SOL NACL 0.9% IRRIG 500ML BOTTLE 2F7123

## (undated) DEVICE — BNDG ELASTIC 3"X5YDS UNSTERILE 6611-30

## (undated) DEVICE — SU MONOCRYL 4-0 PS-2 27" UND Y426H

## (undated) DEVICE — GLOVE BIOGEL PI MICRO SZ 7.0 48570

## (undated) DEVICE — DRSG GAUZE 4X4" 3033

## (undated) DEVICE — DRSG ABDOMINAL 07 1/2X8" 7197D

## (undated) DEVICE — DRSG STERI STRIP 1X5" R1548

## (undated) DEVICE — SPONGE RAY-TEC 4X8" 7318

## (undated) DEVICE — LINEN DRAPE 54X72" 5467

## (undated) DEVICE — LINEN ORTHO PACK 5446

## (undated) DEVICE — COVER CAMERA IN-LIGHT DISP LT-C02

## (undated) DEVICE — DRAPE CONVERTORS U-DRAPE 60X72" 8476

## (undated) DEVICE — SOL WATER IRRIG 500ML BOTTLE 2F7113

## (undated) DEVICE — SLING ARM XLG 79-99158

## (undated) DEVICE — CAST PADDING 3" STERILE 9043S

## (undated) DEVICE — LINEN TOWEL PACK X5 5464

## (undated) RX ORDER — PROPOFOL 10 MG/ML
INJECTION, EMULSION INTRAVENOUS
Status: DISPENSED
Start: 2024-01-19

## (undated) RX ORDER — ONDANSETRON 2 MG/ML
INJECTION INTRAMUSCULAR; INTRAVENOUS
Status: DISPENSED
Start: 2024-01-19

## (undated) RX ORDER — LIDOCAINE HYDROCHLORIDE 10 MG/ML
INJECTION, SOLUTION EPIDURAL; INFILTRATION; INTRACAUDAL; PERINEURAL
Status: DISPENSED
Start: 2024-01-19

## (undated) RX ORDER — LIDOCAINE HYDROCHLORIDE 10 MG/ML
INJECTION, SOLUTION EPIDURAL; INFILTRATION; INTRACAUDAL; PERINEURAL
Status: DISPENSED
Start: 2023-11-20

## (undated) RX ORDER — FENTANYL CITRATE 50 UG/ML
INJECTION, SOLUTION INTRAMUSCULAR; INTRAVENOUS
Status: DISPENSED
Start: 2024-01-19

## (undated) RX ORDER — ACETAMINOPHEN 325 MG/1
TABLET ORAL
Status: DISPENSED
Start: 2024-01-19

## (undated) RX ORDER — KETOROLAC TROMETHAMINE 30 MG/ML
INJECTION, SOLUTION INTRAMUSCULAR; INTRAVENOUS
Status: DISPENSED
Start: 2024-01-19

## (undated) RX ORDER — BUPIVACAINE HYDROCHLORIDE 2.5 MG/ML
INJECTION, SOLUTION EPIDURAL; INFILTRATION; INTRACAUDAL
Status: DISPENSED
Start: 2024-01-19

## (undated) RX ORDER — DEXAMETHASONE SODIUM PHOSPHATE 4 MG/ML
INJECTION, SOLUTION INTRA-ARTICULAR; INTRALESIONAL; INTRAMUSCULAR; INTRAVENOUS; SOFT TISSUE
Status: DISPENSED
Start: 2023-11-20

## (undated) RX ORDER — CEFAZOLIN SODIUM 2 G/50ML
SOLUTION INTRAVENOUS
Status: DISPENSED
Start: 2024-01-19